# Patient Record
Sex: MALE | NOT HISPANIC OR LATINO | Employment: UNEMPLOYED | URBAN - METROPOLITAN AREA
[De-identification: names, ages, dates, MRNs, and addresses within clinical notes are randomized per-mention and may not be internally consistent; named-entity substitution may affect disease eponyms.]

---

## 2019-06-27 PROCEDURE — 99283 EMERGENCY DEPT VISIT LOW MDM: CPT

## 2019-06-28 ENCOUNTER — HOSPITAL ENCOUNTER (EMERGENCY)
Facility: HOSPITAL | Age: 44
Discharge: HOME | End: 2019-06-28
Attending: EMERGENCY MEDICINE | Admitting: EMERGENCY MEDICINE

## 2019-06-28 VITALS
RESPIRATION RATE: 17 BRPM | OXYGEN SATURATION: 99 % | HEART RATE: 69 BPM | HEIGHT: 68 IN | DIASTOLIC BLOOD PRESSURE: 95 MMHG | BODY MASS INDEX: 23.19 KG/M2 | WEIGHT: 153 LBS | TEMPERATURE: 98.9 F | SYSTOLIC BLOOD PRESSURE: 137 MMHG

## 2019-06-28 DIAGNOSIS — G89.29 CHRONIC BILATERAL LOW BACK PAIN WITHOUT SCIATICA: Primary | ICD-10-CM

## 2019-06-28 DIAGNOSIS — M54.50 CHRONIC BILATERAL LOW BACK PAIN WITHOUT SCIATICA: Primary | ICD-10-CM

## 2019-06-28 LAB
HOLD SPECIMEN: NORMAL
HOLD SPECIMEN: NORMAL
WHOLE BLOOD HOLD SPECIMEN: NORMAL
WHOLE BLOOD HOLD SPECIMEN: NORMAL

## 2019-06-28 PROCEDURE — 25010000002 HYDROMORPHONE 1 MG/ML SOLUTION: Performed by: EMERGENCY MEDICINE

## 2019-06-28 PROCEDURE — 25010000002 ONDANSETRON PER 1 MG: Performed by: EMERGENCY MEDICINE

## 2019-06-28 PROCEDURE — 96374 THER/PROPH/DIAG INJ IV PUSH: CPT

## 2019-06-28 PROCEDURE — 96375 TX/PRO/DX INJ NEW DRUG ADDON: CPT

## 2019-06-28 PROCEDURE — 25010000002 LORAZEPAM PER 2 MG: Performed by: EMERGENCY MEDICINE

## 2019-06-28 RX ORDER — LORAZEPAM 1 MG/1
1 TABLET ORAL EVERY 8 HOURS PRN
Qty: 15 TABLET | Refills: 0 | Status: SHIPPED | OUTPATIENT
Start: 2019-06-28

## 2019-06-28 RX ORDER — ONDANSETRON 2 MG/ML
4 INJECTION INTRAMUSCULAR; INTRAVENOUS ONCE
Status: COMPLETED | OUTPATIENT
Start: 2019-06-28 | End: 2019-06-28

## 2019-06-28 RX ORDER — CYCLOBENZAPRINE HCL 10 MG
10 TABLET ORAL 3 TIMES DAILY PRN
COMMUNITY
End: 2019-06-28

## 2019-06-28 RX ORDER — CLONAZEPAM 1 MG/1
1 TABLET ORAL 2 TIMES DAILY
COMMUNITY

## 2019-06-28 RX ORDER — LORAZEPAM 2 MG/ML
0.5 INJECTION INTRAMUSCULAR ONCE
Status: COMPLETED | OUTPATIENT
Start: 2019-06-28 | End: 2019-06-28

## 2019-06-28 RX ORDER — SERTRALINE HYDROCHLORIDE 100 MG/1
100 TABLET, FILM COATED ORAL NIGHTLY
COMMUNITY

## 2019-06-28 RX ORDER — GABAPENTIN 800 MG/1
800 TABLET ORAL NIGHTLY
COMMUNITY

## 2019-06-28 RX ORDER — SODIUM CHLORIDE 0.9 % (FLUSH) 0.9 %
10 SYRINGE (ML) INJECTION AS NEEDED
Status: DISCONTINUED | OUTPATIENT
Start: 2019-06-28 | End: 2019-06-28 | Stop reason: HOSPADM

## 2019-06-28 RX ORDER — HYDROMORPHONE HYDROCHLORIDE 4 MG/1
4 TABLET ORAL EVERY 6 HOURS PRN
COMMUNITY
End: 2019-07-16 | Stop reason: HOSPADM

## 2019-06-28 RX ORDER — OXCARBAZEPINE 150 MG/1
150 TABLET, FILM COATED ORAL NIGHTLY
COMMUNITY

## 2019-06-28 RX ORDER — VARENICLINE TARTRATE 1 MG/1
1 TABLET, FILM COATED ORAL 2 TIMES DAILY
COMMUNITY

## 2019-06-28 RX ADMIN — LORAZEPAM 0.5 MG: 2 INJECTION INTRAMUSCULAR; INTRAVENOUS at 01:24

## 2019-06-28 RX ADMIN — HYDROMORPHONE HYDROCHLORIDE 2 MG: 1 INJECTION, SOLUTION INTRAMUSCULAR; INTRAVENOUS; SUBCUTANEOUS at 01:21

## 2019-06-28 RX ADMIN — ONDANSETRON HYDROCHLORIDE 4 MG: 2 SOLUTION INTRAMUSCULAR; INTRAVENOUS at 01:20

## 2019-07-05 ENCOUNTER — HOSPITAL ENCOUNTER (OUTPATIENT)
Dept: GENERAL RADIOLOGY | Facility: HOSPITAL | Age: 44
Discharge: HOME | End: 2019-07-05
Admitting: ORTHOPAEDIC SURGERY

## 2019-07-05 ENCOUNTER — APPOINTMENT (OUTPATIENT)
Dept: PREADMISSION TESTING | Facility: HOSPITAL | Age: 44
End: 2019-07-05

## 2019-07-05 VITALS
WEIGHT: 166 LBS | DIASTOLIC BLOOD PRESSURE: 79 MMHG | TEMPERATURE: 97.7 F | SYSTOLIC BLOOD PRESSURE: 120 MMHG | HEIGHT: 68 IN | BODY MASS INDEX: 25.16 KG/M2 | HEART RATE: 56 BPM | OXYGEN SATURATION: 100 % | RESPIRATION RATE: 20 BRPM

## 2019-07-05 LAB
25(OH)D3 SERPL-MCNC: 29 NG/ML (ref 30–100)
ABO GROUP BLD: NORMAL
ALBUMIN SERPL-MCNC: 4.2 G/DL (ref 3.5–5.2)
ALBUMIN/GLOB SERPL: 1.4 G/DL
ALP SERPL-CCNC: 80 U/L (ref 39–117)
ALT SERPL W P-5'-P-CCNC: 9 U/L (ref 1–41)
ANION GAP SERPL CALCULATED.3IONS-SCNC: 12.7 MMOL/L (ref 5–15)
APTT PPP: 29 SECONDS (ref 22.7–35.4)
AST SERPL-CCNC: 14 U/L (ref 1–40)
BASOPHILS # BLD AUTO: 0.1 10*3/MM3 (ref 0–0.2)
BASOPHILS NFR BLD AUTO: 1.2 % (ref 0–1.5)
BILIRUB SERPL-MCNC: 0.2 MG/DL (ref 0.2–1.2)
BILIRUB UR QL STRIP: NEGATIVE
BLD GP AB SCN SERPL QL: NEGATIVE
BUN BLD-MCNC: 10 MG/DL (ref 6–20)
BUN/CREAT SERPL: 10.6 (ref 7–25)
CALCIUM SPEC-SCNC: 9.3 MG/DL (ref 8.6–10.5)
CHLORIDE SERPL-SCNC: 101 MMOL/L (ref 98–107)
CLARITY UR: CLEAR
CO2 SERPL-SCNC: 27.3 MMOL/L (ref 22–29)
COLOR UR: YELLOW
CREAT BLD-MCNC: 0.94 MG/DL (ref 0.76–1.27)
DEPRECATED RDW RBC AUTO: 46.1 FL (ref 37–54)
EOSINOPHIL # BLD AUTO: 0.31 10*3/MM3 (ref 0–0.4)
EOSINOPHIL NFR BLD AUTO: 3.8 % (ref 0.3–6.2)
ERYTHROCYTE [DISTWIDTH] IN BLOOD BY AUTOMATED COUNT: 12.9 % (ref 12.3–15.4)
GFR SERPL CREATININE-BSD FRML MDRD: 88 ML/MIN/1.73
GLOBULIN UR ELPH-MCNC: 2.9 GM/DL
GLUCOSE BLD-MCNC: 90 MG/DL (ref 65–99)
GLUCOSE UR STRIP-MCNC: NEGATIVE MG/DL
HCT VFR BLD AUTO: 45.1 % (ref 37.5–51)
HGB BLD-MCNC: 15 G/DL (ref 13–17.7)
HGB UR QL STRIP.AUTO: NEGATIVE
IMM GRANULOCYTES # BLD AUTO: 0.03 10*3/MM3 (ref 0–0.05)
IMM GRANULOCYTES NFR BLD AUTO: 0.4 % (ref 0–0.5)
INR PPP: 0.86 (ref 0.9–1.1)
KETONES UR QL STRIP: NEGATIVE
LEUKOCYTE ESTERASE UR QL STRIP.AUTO: NEGATIVE
LYMPHOCYTES # BLD AUTO: 3.29 10*3/MM3 (ref 0.7–3.1)
LYMPHOCYTES NFR BLD AUTO: 40.1 % (ref 19.6–45.3)
MCH RBC QN AUTO: 31.8 PG (ref 26.6–33)
MCHC RBC AUTO-ENTMCNC: 33.3 G/DL (ref 31.5–35.7)
MCV RBC AUTO: 95.8 FL (ref 79–97)
MONOCYTES # BLD AUTO: 0.85 10*3/MM3 (ref 0.1–0.9)
MONOCYTES NFR BLD AUTO: 10.4 % (ref 5–12)
NEUTROPHILS # BLD AUTO: 3.62 10*3/MM3 (ref 1.7–7)
NEUTROPHILS NFR BLD AUTO: 44.1 % (ref 42.7–76)
NITRITE UR QL STRIP: NEGATIVE
NRBC BLD AUTO-RTO: 0 /100 WBC (ref 0–0.2)
PH UR STRIP.AUTO: 6.5 [PH] (ref 5–8)
PLATELET # BLD AUTO: 325 10*3/MM3 (ref 140–450)
PMV BLD AUTO: 10.8 FL (ref 6–12)
POTASSIUM BLD-SCNC: 3.9 MMOL/L (ref 3.5–5.2)
PROT SERPL-MCNC: 7.1 G/DL (ref 6–8.5)
PROT UR QL STRIP: NEGATIVE
PROTHROMBIN TIME: 11.5 SECONDS (ref 11.7–14.2)
RBC # BLD AUTO: 4.71 10*6/MM3 (ref 4.14–5.8)
RH BLD: POSITIVE
SODIUM BLD-SCNC: 141 MMOL/L (ref 136–145)
SP GR UR STRIP: 1.01 (ref 1–1.03)
T&S EXPIRATION DATE: NORMAL
UROBILINOGEN UR QL STRIP: NORMAL
WBC NRBC COR # BLD: 8.2 10*3/MM3 (ref 3.4–10.8)

## 2019-07-05 PROCEDURE — 86850 RBC ANTIBODY SCREEN: CPT | Performed by: ORTHOPAEDIC SURGERY

## 2019-07-05 PROCEDURE — 86900 BLOOD TYPING SEROLOGIC ABO: CPT | Performed by: ORTHOPAEDIC SURGERY

## 2019-07-05 PROCEDURE — 85025 COMPLETE CBC W/AUTO DIFF WBC: CPT | Performed by: ORTHOPAEDIC SURGERY

## 2019-07-05 PROCEDURE — 82306 VITAMIN D 25 HYDROXY: CPT | Performed by: ORTHOPAEDIC SURGERY

## 2019-07-05 PROCEDURE — 71046 X-RAY EXAM CHEST 2 VIEWS: CPT

## 2019-07-05 PROCEDURE — 86901 BLOOD TYPING SEROLOGIC RH(D): CPT | Performed by: ORTHOPAEDIC SURGERY

## 2019-07-05 PROCEDURE — 85730 THROMBOPLASTIN TIME PARTIAL: CPT | Performed by: ORTHOPAEDIC SURGERY

## 2019-07-05 PROCEDURE — 93010 ELECTROCARDIOGRAM REPORT: CPT | Performed by: INTERNAL MEDICINE

## 2019-07-05 PROCEDURE — 81003 URINALYSIS AUTO W/O SCOPE: CPT | Performed by: ORTHOPAEDIC SURGERY

## 2019-07-05 PROCEDURE — G0480 DRUG TEST DEF 1-7 CLASSES: HCPCS | Performed by: ORTHOPAEDIC SURGERY

## 2019-07-05 PROCEDURE — 93005 ELECTROCARDIOGRAM TRACING: CPT

## 2019-07-05 PROCEDURE — 36415 COLL VENOUS BLD VENIPUNCTURE: CPT

## 2019-07-05 PROCEDURE — 80053 COMPREHEN METABOLIC PANEL: CPT | Performed by: ORTHOPAEDIC SURGERY

## 2019-07-05 PROCEDURE — 85610 PROTHROMBIN TIME: CPT | Performed by: ORTHOPAEDIC SURGERY

## 2019-07-05 RX ORDER — POLYETHYLENE GLYCOL 3350 17 G/17G
17 POWDER, FOR SOLUTION ORAL EVERY MORNING
COMMUNITY

## 2019-07-05 RX ORDER — OMEPRAZOLE 40 MG/1
40 CAPSULE, DELAYED RELEASE ORAL EVERY EVENING
COMMUNITY

## 2019-07-05 NOTE — DISCHARGE INSTRUCTIONS
Take the following medications the morning of surgery with a small sip of water:  OMEPRAZOLE    ARRIVAL TIME 8:00  AM    General Instructions:  • Do not eat solid food after midnight the night before surgery.  • You may drink clear liquids day of surgery but must stop at least one hour before your hospital arrival time.  • It is beneficial for you to have a clear drink that contains carbohydrates the day of surgery.  We suggest a 12 to 20 ounce bottle of Gatorade or Powerade for non-diabetic patients or a 12 to 20 ounce bottle of G2 or Powerade Zero for diabetic patients. (Pediatric patients, are not advised to drink a 12 to 20 ounce carbohydrate drink)    Clear liquids are liquids you can see through.  Nothing red in color.     Plain water                               Sports drinks  Sodas                                   Gelatin (Jell-O)  Fruit juices without pulp such as white grape juice and apple juice  Popsicles that contain no fruit or yogurt  Tea or coffee (no cream or milk added)  Gatorade / Powerade  G2 / Powerade Zero    • Infants may have breast milk up to four hours before surgery.  • Infants drinking formula may drink formula up to six hours before surgery.   • Patients who avoid smoking, chewing tobacco and alcohol for 4 weeks prior to surgery have a reduced risk of post-operative complications.  Quit smoking as many days before surgery as you can.  • Do not smoke, use chewing tobacco or drink alcohol the day of surgery.   • If applicable bring your C-PAP/ BI-PAP machine.  • Bring any papers given to you in the doctor’s office.  • Wear clean comfortable clothes and socks.  • Do not wear contact lenses, false eyelashes or make-up.  Bring a case for your glasses.   • Bring crutches or walker if applicable.  • Remove all piercings.  Leave jewelry and any other valuables at home.  • Hair extensions with metal clips must be removed prior to surgery.  • The Pre-Admission Testing nurse will instruct you to  bring medications if unable to obtain an accurate list in Pre-Admission Testing.        If you were given a blood bank ID arm band remember to bring it with you the day of surgery.    Preventing a Surgical Site Infection:  • For 2 to 3 days before surgery, avoid shaving with a razor because the razor can irritate skin and make it easier to develop an infection.    • Any areas of open skin can increase the risk of a post-operative wound infection by allowing bacteria to enter and travel throughout the body.  Notify your surgeon if you have any skin wounds / rashes even if it is not near the expected surgical site.  The area will need assessed to determine if surgery should be delayed until it is healed.  • The night prior to surgery sleep in a clean bed with clean clothing.  Do not allow pets to sleep with you.  • Shower on the morning of surgery using a fresh bar of anti-bacterial soap (such as Dial) and clean washcloth.  Dry with a clean towel and dress in clean clothing.  • Ask your surgeon if you will be receiving antibiotics prior to surgery.  • Make sure you, your family, and all healthcare providers clean their hands with soap and water or an alcohol based hand  before caring for you or your wound.    Day of surgery:  Upon arrival, a Pre-op nurse and Anesthesiologist will review your health history, obtain vital signs, and answer questions you may have.  The only belongings needed at this time will be a list of your home medications and if applicable your C-PAP/BI-PAP machine.  If you are staying overnight your family can leave the rest of your belongings in the car and bring them to your room later.  A Pre-op nurse will start an IV and you may receive medication in preparation for surgery, including something to help you relax.  Your family will be able to see you in the Pre-op area.  While you are in surgery your family should notify the waiting room  if they leave the waiting room area  and provide a contact phone number.    Please be aware that surgery does come with discomfort.  We want to make every effort to control your discomfort so please discuss any uncontrolled symptoms with your nurse.   Your doctor will most likely have prescribed pain medications.      If you are going home after surgery you will receive individualized written care instructions before being discharged.  A responsible adult must drive you to and from the hospital on the day of your surgery and stay with you for 24 hours.    If you are staying overnight following surgery, you will be transported to your hospital room following the recovery period.  [facility] has all private rooms.    You have received a list of surgical assistants for your reference.  If you have any questions please call Pre-Admission Testing at [phone].  Deductibles and co-payments are collected on the day of service. Please be prepared to pay the required co-pay, deductible or deposit on the day of service as defined by your plan.

## 2019-07-06 LAB
COTININE UR-MCNC: POSITIVE NG/ML
Lab: ABNORMAL

## 2019-07-10 ENCOUNTER — ANESTHESIA (OUTPATIENT)
Dept: PERIOP | Facility: HOSPITAL | Age: 44
End: 2019-07-10

## 2019-07-10 ENCOUNTER — ANESTHESIA EVENT (OUTPATIENT)
Dept: PERIOP | Facility: HOSPITAL | Age: 44
End: 2019-07-10

## 2019-07-10 ENCOUNTER — HOSPITAL ENCOUNTER (INPATIENT)
Facility: HOSPITAL | Age: 44
LOS: 6 days | Discharge: NURSING, REHAB OR LONG-TERM CARE | End: 2019-07-16
Attending: ORTHOPAEDIC SURGERY | Admitting: ORTHOPAEDIC SURGERY

## 2019-07-10 ENCOUNTER — APPOINTMENT (OUTPATIENT)
Dept: GENERAL RADIOLOGY | Facility: HOSPITAL | Age: 44
End: 2019-07-10

## 2019-07-10 DIAGNOSIS — M48.061 SPINAL STENOSIS OF LUMBAR REGION WITHOUT NEUROGENIC CLAUDICATION: Primary | ICD-10-CM

## 2019-07-10 PROCEDURE — C9290 INJ, BUPIVACAINE LIPOSOME: HCPCS | Performed by: ORTHOPAEDIC SURGERY

## 2019-07-10 PROCEDURE — 25010000002 KETOROLAC TROMETHAMINE PER 15 MG: Performed by: ANESTHESIOLOGY

## 2019-07-10 PROCEDURE — 25010000003 BUPIVACAINE LIPOSOME 1.3 % SUSPENSION 20 ML VIAL: Performed by: ORTHOPAEDIC SURGERY

## 2019-07-10 PROCEDURE — 0ST20ZZ RESECTION OF LUMBAR VERTEBRAL DISC, OPEN APPROACH: ICD-10-PCS | Performed by: ORTHOPAEDIC SURGERY

## 2019-07-10 PROCEDURE — C1713 ANCHOR/SCREW BN/BN,TIS/BN: HCPCS | Performed by: ORTHOPAEDIC SURGERY

## 2019-07-10 PROCEDURE — 25010000003 CEFAZOLIN IN DEXTROSE 2-4 GM/100ML-% SOLUTION: Performed by: ORTHOPAEDIC SURGERY

## 2019-07-10 PROCEDURE — 25010000002 FENTANYL CITRATE (PF) 100 MCG/2ML SOLUTION: Performed by: ANESTHESIOLOGY

## 2019-07-10 PROCEDURE — 25010000002 PROPOFOL 1000 MG/ML EMULSION: Performed by: ANESTHESIOLOGY

## 2019-07-10 PROCEDURE — 0SG1071 FUSION OF 2 OR MORE LUMBAR VERTEBRAL JOINTS WITH AUTOLOGOUS TISSUE SUBSTITUTE, POSTERIOR APPROACH, POSTERIOR COLUMN, OPEN APPROACH: ICD-10-PCS | Performed by: ORTHOPAEDIC SURGERY

## 2019-07-10 PROCEDURE — 25010000002 MIDAZOLAM PER 1 MG: Performed by: ANESTHESIOLOGY

## 2019-07-10 PROCEDURE — 25010000002 PROPOFOL 10 MG/ML EMULSION: Performed by: ANESTHESIOLOGY

## 2019-07-10 PROCEDURE — 25010000002 VANCOMYCIN 1 G RECONSTITUTED SOLUTION 1 EACH VIAL: Performed by: ORTHOPAEDIC SURGERY

## 2019-07-10 PROCEDURE — 25010000002 DEXAMETHASONE PER 1 MG: Performed by: ANESTHESIOLOGY

## 2019-07-10 PROCEDURE — C1762 CONN TISS, HUMAN(INC FASCIA): HCPCS | Performed by: ORTHOPAEDIC SURGERY

## 2019-07-10 PROCEDURE — 25010000002 HYDROMORPHONE PER 4 MG: Performed by: ANESTHESIOLOGY

## 2019-07-10 PROCEDURE — 25010000002 MIDAZOLAM PER 1 MG

## 2019-07-10 PROCEDURE — 25010000002 DEXAMETHASONE PER 1 MG: Performed by: ORTHOPAEDIC SURGERY

## 2019-07-10 PROCEDURE — 25010000002 SUCCINYLCHOLINE PER 20 MG: Performed by: ANESTHESIOLOGY

## 2019-07-10 PROCEDURE — C1769 GUIDE WIRE: HCPCS | Performed by: ORTHOPAEDIC SURGERY

## 2019-07-10 PROCEDURE — 72100 X-RAY EXAM L-S SPINE 2/3 VWS: CPT

## 2019-07-10 PROCEDURE — 25010000002 ONDANSETRON PER 1 MG: Performed by: ANESTHESIOLOGY

## 2019-07-10 PROCEDURE — 25010000002 METHOCARBAMOL 1000 MG/10ML SOLUTION: Performed by: ORTHOPAEDIC SURGERY

## 2019-07-10 PROCEDURE — 0SG10AJ FUSION OF 2 OR MORE LUMBAR VERTEBRAL JOINTS WITH INTERBODY FUSION DEVICE, POSTERIOR APPROACH, ANTERIOR COLUMN, OPEN APPROACH: ICD-10-PCS | Performed by: ORTHOPAEDIC SURGERY

## 2019-07-10 PROCEDURE — 76000 FLUOROSCOPY <1 HR PHYS/QHP: CPT

## 2019-07-10 DEVICE — WAX BONE HEMO NAT 2.5G: Type: IMPLANTABLE DEVICE | Site: SPINE LUMBAR | Status: FUNCTIONAL

## 2019-07-10 DEVICE — BATTALION LATERAL SPACER, PEEK, 15°, 18 MM WIDE, 12 X 55 MM
Type: IMPLANTABLE DEVICE | Site: SPINE LUMBAR | Status: FUNCTIONAL
Brand: BATTALION

## 2019-07-10 DEVICE — CANNULATED EXTENDED TAB POLYAXIAL REDUCTION SCREW, 6.5 MM X 40 MM
Type: IMPLANTABLE DEVICE | Site: SPINE LUMBAR | Status: FUNCTIONAL
Brand: INVICTUS

## 2019-07-10 DEVICE — BONE GRAFT KIT 7510400 INFUSE MEDIUM
Type: IMPLANTABLE DEVICE | Site: SPINE LUMBAR | Status: FUNCTIONAL
Brand: INFUSE® BONE GRAFT

## 2019-07-10 DEVICE — ALLOGRFT MORSELIZED CANC TRINITY ELITE LG: Type: IMPLANTABLE DEVICE | Site: SPINE LUMBAR | Status: FUNCTIONAL

## 2019-07-10 DEVICE — BONE CCCS 20 TO 80 30CC: Type: IMPLANTABLE DEVICE | Site: SPINE LUMBAR | Status: FUNCTIONAL

## 2019-07-10 DEVICE — TI MIS LORDOTIC ROD, 5.5 MM X 90 MM
Type: IMPLANTABLE DEVICE | Site: SPINE LUMBAR | Status: FUNCTIONAL
Brand: INVICTUS

## 2019-07-10 DEVICE — SET SCREW
Type: IMPLANTABLE DEVICE | Site: SPINE LUMBAR | Status: FUNCTIONAL
Brand: INVICTUS

## 2019-07-10 RX ORDER — PROMETHAZINE HYDROCHLORIDE 25 MG/1
25 TABLET ORAL ONCE AS NEEDED
Status: DISCONTINUED | OUTPATIENT
Start: 2019-07-10 | End: 2019-07-10 | Stop reason: HOSPADM

## 2019-07-10 RX ORDER — GABAPENTIN 400 MG/1
400 CAPSULE ORAL
Status: DISCONTINUED | OUTPATIENT
Start: 2019-07-10 | End: 2019-07-16 | Stop reason: HOSPADM

## 2019-07-10 RX ORDER — NALOXONE HCL 0.4 MG/ML
0.1 VIAL (ML) INJECTION
Status: DISCONTINUED | OUTPATIENT
Start: 2019-07-10 | End: 2019-07-16 | Stop reason: HOSPADM

## 2019-07-10 RX ORDER — PROMETHAZINE HYDROCHLORIDE 25 MG/1
12.5 SUPPOSITORY RECTAL EVERY 6 HOURS PRN
Status: DISCONTINUED | OUTPATIENT
Start: 2019-07-10 | End: 2019-07-16 | Stop reason: HOSPADM

## 2019-07-10 RX ORDER — FLUMAZENIL 0.1 MG/ML
0.2 INJECTION INTRAVENOUS AS NEEDED
Status: DISCONTINUED | OUTPATIENT
Start: 2019-07-10 | End: 2019-07-10 | Stop reason: HOSPADM

## 2019-07-10 RX ORDER — FENTANYL CITRATE 50 UG/ML
50 INJECTION, SOLUTION INTRAMUSCULAR; INTRAVENOUS
Status: DISCONTINUED | OUTPATIENT
Start: 2019-07-10 | End: 2019-07-10 | Stop reason: HOSPADM

## 2019-07-10 RX ORDER — HYDROMORPHONE HYDROCHLORIDE 4 MG/1
4 TABLET ORAL EVERY 4 HOURS PRN
Status: DISCONTINUED | OUTPATIENT
Start: 2019-07-10 | End: 2019-07-12

## 2019-07-10 RX ORDER — OMEGA-3S/DHA/EPA/FISH OIL/D3 300MG-1000
400 CAPSULE ORAL DAILY
Status: DISCONTINUED | OUTPATIENT
Start: 2019-07-10 | End: 2019-07-16 | Stop reason: HOSPADM

## 2019-07-10 RX ORDER — IBUPROFEN 800 MG
400 TABLET ORAL EVERY EVENING
COMMUNITY

## 2019-07-10 RX ORDER — PROMETHAZINE HYDROCHLORIDE 25 MG/ML
12.5 INJECTION, SOLUTION INTRAMUSCULAR; INTRAVENOUS EVERY 6 HOURS PRN
Status: DISCONTINUED | OUTPATIENT
Start: 2019-07-10 | End: 2019-07-16 | Stop reason: HOSPADM

## 2019-07-10 RX ORDER — PANTOPRAZOLE SODIUM 40 MG/1
40 TABLET, DELAYED RELEASE ORAL EVERY MORNING
Status: DISCONTINUED | OUTPATIENT
Start: 2019-07-11 | End: 2019-07-16 | Stop reason: HOSPADM

## 2019-07-10 RX ORDER — DEXAMETHASONE SODIUM PHOSPHATE 10 MG/ML
INJECTION INTRAMUSCULAR; INTRAVENOUS AS NEEDED
Status: DISCONTINUED | OUTPATIENT
Start: 2019-07-10 | End: 2019-07-10 | Stop reason: SURG

## 2019-07-10 RX ORDER — BISACODYL 5 MG/1
5 TABLET, DELAYED RELEASE ORAL DAILY PRN
Status: DISCONTINUED | OUTPATIENT
Start: 2019-07-10 | End: 2019-07-16 | Stop reason: HOSPADM

## 2019-07-10 RX ORDER — CEFAZOLIN SODIUM 2 G/100ML
2 INJECTION, SOLUTION INTRAVENOUS ONCE
Status: COMPLETED | OUTPATIENT
Start: 2019-07-10 | End: 2019-07-10

## 2019-07-10 RX ORDER — ONDANSETRON 2 MG/ML
4 INJECTION INTRAMUSCULAR; INTRAVENOUS EVERY 6 HOURS PRN
Status: DISCONTINUED | OUTPATIENT
Start: 2019-07-10 | End: 2019-07-16 | Stop reason: HOSPADM

## 2019-07-10 RX ORDER — PROMETHAZINE HYDROCHLORIDE 25 MG/ML
12.5 INJECTION, SOLUTION INTRAMUSCULAR; INTRAVENOUS ONCE AS NEEDED
Status: DISCONTINUED | OUTPATIENT
Start: 2019-07-10 | End: 2019-07-10 | Stop reason: HOSPADM

## 2019-07-10 RX ORDER — MIDAZOLAM HYDROCHLORIDE 1 MG/ML
1 INJECTION INTRAMUSCULAR; INTRAVENOUS
Status: DISCONTINUED | OUTPATIENT
Start: 2019-07-10 | End: 2019-07-10 | Stop reason: HOSPADM

## 2019-07-10 RX ORDER — GABAPENTIN 800 MG/1
400 TABLET ORAL EVERY MORNING
COMMUNITY

## 2019-07-10 RX ORDER — OXYCODONE AND ACETAMINOPHEN 7.5; 325 MG/1; MG/1
1 TABLET ORAL ONCE AS NEEDED
Status: DISCONTINUED | OUTPATIENT
Start: 2019-07-10 | End: 2019-07-10 | Stop reason: HOSPADM

## 2019-07-10 RX ORDER — GABAPENTIN 300 MG/1
300 CAPSULE ORAL ONCE
Status: COMPLETED | OUTPATIENT
Start: 2019-07-10 | End: 2019-07-10

## 2019-07-10 RX ORDER — MIDAZOLAM HYDROCHLORIDE 1 MG/ML
2 INJECTION INTRAMUSCULAR; INTRAVENOUS
Status: DISCONTINUED | OUTPATIENT
Start: 2019-07-10 | End: 2019-07-10 | Stop reason: HOSPADM

## 2019-07-10 RX ORDER — METHOCARBAMOL 100 MG/ML
1000 INJECTION, SOLUTION INTRAMUSCULAR; INTRAVENOUS ONCE AS NEEDED
Status: COMPLETED | OUTPATIENT
Start: 2019-07-10 | End: 2019-07-10

## 2019-07-10 RX ORDER — CEFAZOLIN SODIUM 2 G/100ML
2 INJECTION, SOLUTION INTRAVENOUS EVERY 8 HOURS
Status: COMPLETED | OUTPATIENT
Start: 2019-07-10 | End: 2019-07-11

## 2019-07-10 RX ORDER — KETAMINE HYDROCHLORIDE 10 MG/ML
INJECTION INTRAMUSCULAR; INTRAVENOUS AS NEEDED
Status: DISCONTINUED | OUTPATIENT
Start: 2019-07-10 | End: 2019-07-10 | Stop reason: SURG

## 2019-07-10 RX ORDER — PROMETHAZINE HYDROCHLORIDE 25 MG/ML
6.25 INJECTION, SOLUTION INTRAMUSCULAR; INTRAVENOUS
Status: DISCONTINUED | OUTPATIENT
Start: 2019-07-10 | End: 2019-07-10 | Stop reason: HOSPADM

## 2019-07-10 RX ORDER — SODIUM CHLORIDE 0.9 % (FLUSH) 0.9 %
3 SYRINGE (ML) INJECTION EVERY 12 HOURS SCHEDULED
Status: DISCONTINUED | OUTPATIENT
Start: 2019-07-10 | End: 2019-07-10 | Stop reason: HOSPADM

## 2019-07-10 RX ORDER — MIDAZOLAM HYDROCHLORIDE 1 MG/ML
INJECTION INTRAMUSCULAR; INTRAVENOUS
Status: COMPLETED
Start: 2019-07-10 | End: 2019-07-10

## 2019-07-10 RX ORDER — FAMOTIDINE 10 MG/ML
20 INJECTION, SOLUTION INTRAVENOUS
Status: COMPLETED | OUTPATIENT
Start: 2019-07-10 | End: 2019-07-10

## 2019-07-10 RX ORDER — HYDRALAZINE HYDROCHLORIDE 20 MG/ML
5 INJECTION INTRAMUSCULAR; INTRAVENOUS
Status: DISCONTINUED | OUTPATIENT
Start: 2019-07-10 | End: 2019-07-10 | Stop reason: HOSPADM

## 2019-07-10 RX ORDER — ONDANSETRON 2 MG/ML
INJECTION INTRAMUSCULAR; INTRAVENOUS AS NEEDED
Status: DISCONTINUED | OUTPATIENT
Start: 2019-07-10 | End: 2019-07-10 | Stop reason: SURG

## 2019-07-10 RX ORDER — HYDROMORPHONE HCL IN 0.9% NACL 10 MG/50ML
PATIENT CONTROLLED ANALGESIA SYRINGE INTRAVENOUS CONTINUOUS
Status: DISPENSED | OUTPATIENT
Start: 2019-07-10 | End: 2019-07-11

## 2019-07-10 RX ORDER — SODIUM CHLORIDE, SODIUM LACTATE, POTASSIUM CHLORIDE, CALCIUM CHLORIDE 600; 310; 30; 20 MG/100ML; MG/100ML; MG/100ML; MG/100ML
9 INJECTION, SOLUTION INTRAVENOUS CONTINUOUS PRN
Status: DISCONTINUED | OUTPATIENT
Start: 2019-07-10 | End: 2019-07-10 | Stop reason: HOSPADM

## 2019-07-10 RX ORDER — CLONAZEPAM 0.5 MG/1
1 TABLET ORAL 2 TIMES DAILY
Status: DISCONTINUED | OUTPATIENT
Start: 2019-07-10 | End: 2019-07-16 | Stop reason: HOSPADM

## 2019-07-10 RX ORDER — GABAPENTIN 400 MG/1
400 CAPSULE ORAL
Status: DISCONTINUED | OUTPATIENT
Start: 2019-07-11 | End: 2019-07-16 | Stop reason: HOSPADM

## 2019-07-10 RX ORDER — SODIUM CHLORIDE 9 MG/ML
INJECTION, SOLUTION INTRAVENOUS AS NEEDED
Status: DISCONTINUED | OUTPATIENT
Start: 2019-07-10 | End: 2019-07-10 | Stop reason: HOSPADM

## 2019-07-10 RX ORDER — CYCLOBENZAPRINE HCL 10 MG
10 TABLET ORAL EVERY 8 HOURS SCHEDULED
Status: DISCONTINUED | OUTPATIENT
Start: 2019-07-10 | End: 2019-07-11

## 2019-07-10 RX ORDER — HYDROMORPHONE HCL 110MG/55ML
PATIENT CONTROLLED ANALGESIA SYRINGE INTRAVENOUS AS NEEDED
Status: DISCONTINUED | OUTPATIENT
Start: 2019-07-10 | End: 2019-07-10 | Stop reason: SURG

## 2019-07-10 RX ORDER — MAGNESIUM HYDROXIDE 1200 MG/15ML
LIQUID ORAL AS NEEDED
Status: DISCONTINUED | OUTPATIENT
Start: 2019-07-10 | End: 2019-07-10 | Stop reason: HOSPADM

## 2019-07-10 RX ORDER — OXYCODONE HCL 10 MG/1
10 TABLET, FILM COATED, EXTENDED RELEASE ORAL ONCE
Status: COMPLETED | OUTPATIENT
Start: 2019-07-10 | End: 2019-07-10

## 2019-07-10 RX ORDER — SUCCINYLCHOLINE CHLORIDE 20 MG/ML
INJECTION INTRAMUSCULAR; INTRAVENOUS AS NEEDED
Status: DISCONTINUED | OUTPATIENT
Start: 2019-07-10 | End: 2019-07-10 | Stop reason: SURG

## 2019-07-10 RX ORDER — DIPHENHYDRAMINE HCL 25 MG
25 CAPSULE ORAL
Status: DISCONTINUED | OUTPATIENT
Start: 2019-07-10 | End: 2019-07-10 | Stop reason: HOSPADM

## 2019-07-10 RX ORDER — HYDROMORPHONE HYDROCHLORIDE 1 MG/ML
0.5 INJECTION, SOLUTION INTRAMUSCULAR; INTRAVENOUS; SUBCUTANEOUS
Status: DISCONTINUED | OUTPATIENT
Start: 2019-07-10 | End: 2019-07-10 | Stop reason: HOSPADM

## 2019-07-10 RX ORDER — MIDAZOLAM HYDROCHLORIDE 1 MG/ML
INJECTION INTRAMUSCULAR; INTRAVENOUS AS NEEDED
Status: DISCONTINUED | OUTPATIENT
Start: 2019-07-10 | End: 2019-07-10 | Stop reason: SURG

## 2019-07-10 RX ORDER — ONDANSETRON 2 MG/ML
4 INJECTION INTRAMUSCULAR; INTRAVENOUS ONCE AS NEEDED
Status: DISCONTINUED | OUTPATIENT
Start: 2019-07-10 | End: 2019-07-10 | Stop reason: HOSPADM

## 2019-07-10 RX ORDER — FENTANYL CITRATE 50 UG/ML
INJECTION, SOLUTION INTRAMUSCULAR; INTRAVENOUS AS NEEDED
Status: DISCONTINUED | OUTPATIENT
Start: 2019-07-10 | End: 2019-07-10 | Stop reason: SURG

## 2019-07-10 RX ORDER — ONDANSETRON 4 MG/1
4 TABLET, FILM COATED ORAL EVERY 6 HOURS PRN
Status: DISCONTINUED | OUTPATIENT
Start: 2019-07-10 | End: 2019-07-16 | Stop reason: HOSPADM

## 2019-07-10 RX ORDER — HYDROCODONE BITARTRATE AND ACETAMINOPHEN 7.5; 325 MG/1; MG/1
1 TABLET ORAL ONCE AS NEEDED
Status: DISCONTINUED | OUTPATIENT
Start: 2019-07-10 | End: 2019-07-10 | Stop reason: HOSPADM

## 2019-07-10 RX ORDER — SERTRALINE HYDROCHLORIDE 100 MG/1
100 TABLET, FILM COATED ORAL NIGHTLY
Status: DISCONTINUED | OUTPATIENT
Start: 2019-07-10 | End: 2019-07-16 | Stop reason: HOSPADM

## 2019-07-10 RX ORDER — SODIUM CHLORIDE 0.9 % (FLUSH) 0.9 %
3-10 SYRINGE (ML) INJECTION AS NEEDED
Status: DISCONTINUED | OUTPATIENT
Start: 2019-07-10 | End: 2019-07-16 | Stop reason: HOSPADM

## 2019-07-10 RX ORDER — LABETALOL HYDROCHLORIDE 5 MG/ML
5 INJECTION, SOLUTION INTRAVENOUS
Status: DISCONTINUED | OUTPATIENT
Start: 2019-07-10 | End: 2019-07-10 | Stop reason: HOSPADM

## 2019-07-10 RX ORDER — NALOXONE HCL 0.4 MG/ML
0.2 VIAL (ML) INJECTION AS NEEDED
Status: DISCONTINUED | OUTPATIENT
Start: 2019-07-10 | End: 2019-07-10 | Stop reason: HOSPADM

## 2019-07-10 RX ORDER — GABAPENTIN 400 MG/1
800 CAPSULE ORAL NIGHTLY
Status: DISCONTINUED | OUTPATIENT
Start: 2019-07-10 | End: 2019-07-16 | Stop reason: HOSPADM

## 2019-07-10 RX ORDER — CYCLOBENZAPRINE HCL 10 MG
5-10 TABLET ORAL NIGHTLY PRN
COMMUNITY

## 2019-07-10 RX ORDER — SODIUM CHLORIDE 0.9 % (FLUSH) 0.9 %
3-10 SYRINGE (ML) INJECTION AS NEEDED
Status: DISCONTINUED | OUTPATIENT
Start: 2019-07-10 | End: 2019-07-10 | Stop reason: HOSPADM

## 2019-07-10 RX ORDER — SODIUM CHLORIDE 0.9 % (FLUSH) 0.9 %
3 SYRINGE (ML) INJECTION EVERY 12 HOURS SCHEDULED
Status: DISCONTINUED | OUTPATIENT
Start: 2019-07-10 | End: 2019-07-16 | Stop reason: HOSPADM

## 2019-07-10 RX ORDER — MELATONIN 10 MG
10 CAPSULE ORAL NIGHTLY
COMMUNITY

## 2019-07-10 RX ORDER — VARENICLINE TARTRATE 0.5 MG/1
1 TABLET, FILM COATED ORAL 2 TIMES DAILY
Status: DISCONTINUED | OUTPATIENT
Start: 2019-07-10 | End: 2019-07-16 | Stop reason: HOSPADM

## 2019-07-10 RX ORDER — PROMETHAZINE HYDROCHLORIDE 25 MG/1
25 SUPPOSITORY RECTAL ONCE AS NEEDED
Status: DISCONTINUED | OUTPATIENT
Start: 2019-07-10 | End: 2019-07-10 | Stop reason: HOSPADM

## 2019-07-10 RX ORDER — EPHEDRINE SULFATE 50 MG/ML
5 INJECTION, SOLUTION INTRAVENOUS ONCE AS NEEDED
Status: DISCONTINUED | OUTPATIENT
Start: 2019-07-10 | End: 2019-07-10 | Stop reason: HOSPADM

## 2019-07-10 RX ORDER — PROMETHAZINE HYDROCHLORIDE 12.5 MG/1
12.5 TABLET ORAL EVERY 6 HOURS PRN
Status: DISCONTINUED | OUTPATIENT
Start: 2019-07-10 | End: 2019-07-16 | Stop reason: HOSPADM

## 2019-07-10 RX ORDER — GABAPENTIN 300 MG/1
600 CAPSULE ORAL ONCE
Status: DISCONTINUED | OUTPATIENT
Start: 2019-07-10 | End: 2019-07-10

## 2019-07-10 RX ORDER — DIPHENHYDRAMINE HYDROCHLORIDE 50 MG/ML
12.5 INJECTION INTRAMUSCULAR; INTRAVENOUS
Status: DISCONTINUED | OUTPATIENT
Start: 2019-07-10 | End: 2019-07-10 | Stop reason: HOSPADM

## 2019-07-10 RX ORDER — DOCUSATE SODIUM 100 MG/1
100 CAPSULE, LIQUID FILLED ORAL 2 TIMES DAILY PRN
Status: DISCONTINUED | OUTPATIENT
Start: 2019-07-10 | End: 2019-07-16 | Stop reason: HOSPADM

## 2019-07-10 RX ORDER — PROPOFOL 10 MG/ML
VIAL (ML) INTRAVENOUS AS NEEDED
Status: DISCONTINUED | OUTPATIENT
Start: 2019-07-10 | End: 2019-07-10 | Stop reason: SURG

## 2019-07-10 RX ORDER — ACETAMINOPHEN 325 MG/1
650 TABLET ORAL ONCE AS NEEDED
Status: DISCONTINUED | OUTPATIENT
Start: 2019-07-10 | End: 2019-07-10 | Stop reason: HOSPADM

## 2019-07-10 RX ORDER — KETOROLAC TROMETHAMINE 30 MG/ML
INJECTION, SOLUTION INTRAMUSCULAR; INTRAVENOUS AS NEEDED
Status: DISCONTINUED | OUTPATIENT
Start: 2019-07-10 | End: 2019-07-10 | Stop reason: SURG

## 2019-07-10 RX ORDER — SENNA AND DOCUSATE SODIUM 50; 8.6 MG/1; MG/1
1 TABLET, FILM COATED ORAL NIGHTLY PRN
Status: DISCONTINUED | OUTPATIENT
Start: 2019-07-10 | End: 2019-07-16 | Stop reason: HOSPADM

## 2019-07-10 RX ORDER — OXCARBAZEPINE 150 MG/1
150 TABLET, FILM COATED ORAL NIGHTLY
Status: DISCONTINUED | OUTPATIENT
Start: 2019-07-10 | End: 2019-07-16 | Stop reason: HOSPADM

## 2019-07-10 RX ORDER — BISACODYL 10 MG
10 SUPPOSITORY, RECTAL RECTAL DAILY PRN
Status: DISCONTINUED | OUTPATIENT
Start: 2019-07-10 | End: 2019-07-16 | Stop reason: HOSPADM

## 2019-07-10 RX ORDER — SODIUM CHLORIDE 450 MG/100ML
100 INJECTION, SOLUTION INTRAVENOUS CONTINUOUS
Status: DISCONTINUED | OUTPATIENT
Start: 2019-07-10 | End: 2019-07-15

## 2019-07-10 RX ADMIN — MIDAZOLAM HYDROCHLORIDE 1 MG: 1 INJECTION INTRAMUSCULAR; INTRAVENOUS at 15:44

## 2019-07-10 RX ADMIN — MIDAZOLAM 1 MG: 1 INJECTION INTRAMUSCULAR; INTRAVENOUS at 15:44

## 2019-07-10 RX ADMIN — SODIUM CHLORIDE, POTASSIUM CHLORIDE, SODIUM LACTATE AND CALCIUM CHLORIDE: 600; 310; 30; 20 INJECTION, SOLUTION INTRAVENOUS at 12:40

## 2019-07-10 RX ADMIN — SODIUM CHLORIDE 100 ML/HR: 4.5 INJECTION, SOLUTION INTRAVENOUS at 18:45

## 2019-07-10 RX ADMIN — DEXAMETHASONE SODIUM PHOSPHATE 8 MG: 10 INJECTION INTRAMUSCULAR; INTRAVENOUS at 13:17

## 2019-07-10 RX ADMIN — CEFAZOLIN SODIUM 2 G: 2 INJECTION, SOLUTION INTRAVENOUS at 21:51

## 2019-07-10 RX ADMIN — FENTANYL CITRATE 50 MCG: 50 INJECTION, SOLUTION INTRAMUSCULAR; INTRAVENOUS at 12:10

## 2019-07-10 RX ADMIN — HYDROMORPHONE HYDROCHLORIDE 1 MG: 2 INJECTION INTRAMUSCULAR; INTRAVENOUS; SUBCUTANEOUS at 12:20

## 2019-07-10 RX ADMIN — SODIUM CHLORIDE, POTASSIUM CHLORIDE, SODIUM LACTATE AND CALCIUM CHLORIDE 9 ML/HR: 600; 310; 30; 20 INJECTION, SOLUTION INTRAVENOUS at 09:15

## 2019-07-10 RX ADMIN — KETAMINE HYDROCHLORIDE 10 MG: 10 INJECTION INTRAMUSCULAR; INTRAVENOUS at 13:13

## 2019-07-10 RX ADMIN — GABAPENTIN 300 MG: 300 CAPSULE ORAL at 09:27

## 2019-07-10 RX ADMIN — METHOCARBAMOL 1000 MG: 100 INJECTION, SOLUTION INTRAMUSCULAR; INTRAVENOUS at 16:16

## 2019-07-10 RX ADMIN — FENTANYL CITRATE 50 MCG: 50 INJECTION INTRAMUSCULAR; INTRAVENOUS at 16:10

## 2019-07-10 RX ADMIN — SODIUM CHLORIDE, PRESERVATIVE FREE 3 ML: 5 INJECTION INTRAVENOUS at 21:52

## 2019-07-10 RX ADMIN — DEXAMETHASONE SODIUM PHOSPHATE 10 MG: 10 INJECTION INTRAMUSCULAR; INTRAVENOUS at 09:42

## 2019-07-10 RX ADMIN — Medication 2 MG: at 12:04

## 2019-07-10 RX ADMIN — KETAMINE HYDROCHLORIDE 10 MG: 10 INJECTION INTRAMUSCULAR; INTRAVENOUS at 12:26

## 2019-07-10 RX ADMIN — CLONAZEPAM 1 MG: 1 TABLET ORAL at 21:52

## 2019-07-10 RX ADMIN — FAMOTIDINE 20 MG: 10 INJECTION INTRAVENOUS at 09:41

## 2019-07-10 RX ADMIN — SERTRALINE 100 MG: 100 TABLET, FILM COATED ORAL at 21:52

## 2019-07-10 RX ADMIN — HYDROMORPHONE HYDROCHLORIDE 4 MG: 4 TABLET ORAL at 23:06

## 2019-07-10 RX ADMIN — CHOLECALCIFEROL TAB 10 MCG (400 UNIT) 400 UNITS: 10 TAB at 21:51

## 2019-07-10 RX ADMIN — MIDAZOLAM 1 MG: 1 INJECTION INTRAMUSCULAR; INTRAVENOUS at 11:10

## 2019-07-10 RX ADMIN — HYDROMORPHONE HYDROCHLORIDE 1 MG: 2 INJECTION INTRAMUSCULAR; INTRAVENOUS; SUBCUTANEOUS at 15:31

## 2019-07-10 RX ADMIN — FENTANYL CITRATE 50 MCG: 50 INJECTION INTRAMUSCULAR; INTRAVENOUS at 15:40

## 2019-07-10 RX ADMIN — KETAMINE HYDROCHLORIDE 10 MG: 10 INJECTION INTRAMUSCULAR; INTRAVENOUS at 13:58

## 2019-07-10 RX ADMIN — FENTANYL CITRATE 100 MCG: 50 INJECTION, SOLUTION INTRAMUSCULAR; INTRAVENOUS at 13:05

## 2019-07-10 RX ADMIN — OXYCODONE HYDROCHLORIDE 10 MG: 10 TABLET, FILM COATED, EXTENDED RELEASE ORAL at 09:27

## 2019-07-10 RX ADMIN — CEFAZOLIN SODIUM 2 G: 2 INJECTION, SOLUTION INTRAVENOUS at 12:45

## 2019-07-10 RX ADMIN — KETAMINE HYDROCHLORIDE 10 MG: 10 INJECTION INTRAMUSCULAR; INTRAVENOUS at 13:04

## 2019-07-10 RX ADMIN — VARENICLINE TARTRATE 1 MG: 0.5 TABLET, FILM COATED ORAL at 21:52

## 2019-07-10 RX ADMIN — SODIUM CHLORIDE 1 MCG/KG/HR: 900 INJECTION, SOLUTION INTRAVENOUS at 12:25

## 2019-07-10 RX ADMIN — PROPOFOL 200 MG: 10 INJECTION, EMULSION INTRAVENOUS at 12:10

## 2019-07-10 RX ADMIN — HYDROMORPHONE HYDROCHLORIDE: 10 INJECTION INTRAMUSCULAR; INTRAVENOUS; SUBCUTANEOUS at 16:02

## 2019-07-10 RX ADMIN — PROPOFOL 125 MCG/KG/MIN: 10 INJECTION, EMULSION INTRAVENOUS at 12:10

## 2019-07-10 RX ADMIN — KETOROLAC TROMETHAMINE 30 MG: 30 INJECTION, SOLUTION INTRAMUSCULAR; INTRAVENOUS at 15:06

## 2019-07-10 RX ADMIN — GABAPENTIN 800 MG: 400 CAPSULE ORAL at 21:51

## 2019-07-10 RX ADMIN — KETAMINE HYDROCHLORIDE 10 MG: 10 INJECTION INTRAMUSCULAR; INTRAVENOUS at 14:54

## 2019-07-10 RX ADMIN — FENTANYL CITRATE 100 MCG: 50 INJECTION, SOLUTION INTRAMUSCULAR; INTRAVENOUS at 12:08

## 2019-07-10 RX ADMIN — HYDROMORPHONE HYDROCHLORIDE 4 MG: 4 TABLET ORAL at 18:19

## 2019-07-10 RX ADMIN — OXCARBAZEPINE 150 MG: 150 TABLET, FILM COATED ORAL at 21:52

## 2019-07-10 RX ADMIN — ONDANSETRON 4 MG: 2 INJECTION INTRAMUSCULAR; INTRAVENOUS at 15:04

## 2019-07-10 RX ADMIN — SUCCINYLCHOLINE CHLORIDE 120 MG: 20 INJECTION, SOLUTION INTRAMUSCULAR; INTRAVENOUS; PARENTERAL at 12:13

## 2019-07-10 NOTE — PERIOPERATIVE NURSING NOTE
600MG GABAPENTIN WAS ORDERED FOR PT TO TAKE IN PREOP. PT TOOK 400MG THIS MORNING AT HOME. DR ENNIS NOTIFIED. DOSE CHANGED IN PREOP FROM 600MG TO 300MG.

## 2019-07-10 NOTE — NURSING NOTE
Patient had small bowel movement once positioned on OR table.  Patient cleansed and dried before prepping and draping completed.

## 2019-07-10 NOTE — PLAN OF CARE
Problem: Patient Care Overview  Goal: Plan of Care Review  Outcome: Ongoing (interventions implemented as appropriate)   07/10/19 3692   Coping/Psychosocial   Plan of Care Reviewed With patient   Plan of Care Review   Progress no change   OTHER   Outcome Summary post op today from lumbar fusion. Dressings are clean dry and intact. VSS. Voiding per hess at this time. numbness and tingling to BLE. PCA intact but pt reports that pain is not controlled. Call placed to doctor about pain control. Will continue to monitor.      Goal: Individualization and Mutuality  Outcome: Ongoing (interventions implemented as appropriate)    Goal: Discharge Needs Assessment  Outcome: Ongoing (interventions implemented as appropriate)    Goal: Interprofessional Rounds/Family Conf  Outcome: Ongoing (interventions implemented as appropriate)      Problem: Pain, Chronic (Adult)  Goal: Identify Related Risk Factors and Signs and Symptoms  Outcome: Ongoing (interventions implemented as appropriate)    Goal: Acceptable Pain/Comfort Level and Functional Ability  Outcome: Ongoing (interventions implemented as appropriate)      Problem: Fall Risk (Adult)  Goal: Identify Related Risk Factors and Signs and Symptoms  Outcome: Ongoing (interventions implemented as appropriate)    Goal: Absence of Fall  Outcome: Ongoing (interventions implemented as appropriate)      Problem: Laminectomy/Foraminotomy/Discectomy (Adult)  Goal: Signs and Symptoms of Listed Potential Problems Will be Absent, Minimized or Managed (Laminectomy/Foraminotomy/Discectomy)  Outcome: Ongoing (interventions implemented as appropriate)    Goal: Anesthesia/Sedation Recovery  Outcome: Ongoing (interventions implemented as appropriate)

## 2019-07-10 NOTE — ANESTHESIA PROCEDURE NOTES
Airway  Urgency: elective    Date/Time: 7/10/2019 12:17 PM  End Time:7/10/2019 12:17 PM  Airway not difficult    General Information and Staff    Patient location during procedure: OR  Anesthesiologist: Gregory Stovall MD    Indications and Patient Condition  Indications for airway management: airway protection    Preoxygenated: yes  Mask difficulty assessment: 1 - vent by mask    Final Airway Details  Final airway type: endotracheal airway      Successful airway: ETT  Cuffed: yes   Successful intubation technique: direct laryngoscopy  Endotracheal tube insertion site: oral  Blade: Rogelio  Blade size: 4  ETT size (mm): 7.5  Cormack-Lehane Classification: grade I - full view of glottis  Placement verified by: chest auscultation   Measured from: lips  ETT to lips (cm): 23  Number of attempts at approach: 1    Additional Comments  1 time successful with 7.0 ETT however there was a leak around the ETT cuff therefore sized up ETT to 7.5 without complication

## 2019-07-10 NOTE — PERIOPERATIVE NURSING NOTE
Patient complains of low back pain that radiates down both legs with intermittent numbness and tingling.

## 2019-07-10 NOTE — OP NOTE
PREOPERATIVE DIAGNOSES:  1.  Lumbar instability  2.  Lumbar spinal stenosis  3.  Lumbar radiculopathy   POSTOPERATIVE DIAGNOSES:  Same  PROCEDURES PERFORMED:   1. Anterior lumbar arthrodesis, including removal of the disk for preparation of the interspace for fusion, L4-5 CPT 43821  2. Anterior lumbar arthrodesis, including removal of the disk for preparation of the interspace for fusion, L3-4 CPT 34730  3. Application of intervertebral biomechanical device, L4-5 CPT 83117  4. Application of intervertebral biomechanical device, L3-4 CPT 37399  5.  Posterior spinal fusion L3-L4 17991  6.  Posterior spinal fusion L4-L5 30996  7.  Segmental posterior spinal instrumentation L3, L4, L5 22842  8.  Bone marrow aspirate from iliac crest 20939     SURGEON: Rosalba Sloan DO      ASSISTANT: Jennie Harris PA-C.      NOTE: Please note as part of the procedure I utilized the services of an assistant, specifically, Jennie Harris PA-C. Jennie Harris PA-C participated in crucial portions of the operation. The use of Jennie Harris PA-C greatly reduced overall operative time, thereby reducing overall morbidity for the patient.      ANESTHESIA: General.      ESTIMATED BLOOD LOSS: 250 ml      SPECIMENS: * No orders in the log *     COMPLICATIONS: None apparent.      IMPLANTS: [employer]phate[employer] Battalion     DISPOSITION: Patient to the recovery room in stable condition.      INDICATIONS: The patient is a 44 y.o. with  spinal stenosis and degenerative scoliosis causing neurogenic claudication.  Patient tried and failed extensive conservative care.  I recommended staged lateral and posterior spinal fusion.  INFORMED CONSENT: I described the risks of surgery including but not limited to; bleeding, infection, blood clots, pulmonary embolus, heart attack, stroke, nerve damage causing complete or partial paralysis, dural tear causing spinal headache, nonunion, hardware complication, injury to the psoas muscle, femoral nerve injury causing  possible thigh weakness and numbness, need for further surgery, lack of guarantee, continued numbness, and continued weakness.  The patient many questions about these risks.  He would like to proceed to surgery.  The patient also signed surgical consent for the use of bone morphogenic protein.  Informed consent obtained.     DESCRIPTION OF PROCEDURE: After identifying the patient in the preoperative holding area, all labs and consent were found to be in order. RICHARD hose and SCDs were applied for thromboembolic prophylaxis. Her left side was marked with an indelible marker. The patient was transferred to the operative suite. In the OR, she was given the benefit of general anesthesia. He had neural monitoring leads placed. Meng catheter placed and was given Cleocin preoperatively. She was then carefully positioned in the lateral position with axillary roll. All bony prominences padded. He was taped to the table. The table was jackknifed to open up the exposure to the spine and presurgery x-rays were taken to confirm a good visualization of the spine. The surgical area was then prepped and draped in the usual sterile fashion. Timeout was performed. This was followed by use of fluoroscopy to identify the location of the L3-5 disks. I marked this on [place]an and then made a 7 cm incision with a #10 blade. I dissected with the Bovie down to the fascia of the oblique muscles, which was incised. I then entered the retroperitoneal space after incising the transversalis fascia. I then performed blunt finger dissection down to palpate the spine including the transverse process and the psoas muscle. I then directed triggered EMG probe into the disk space and had neuromonitoring checked for proximity to the femoral nerve or lumbar plexus.  I then took an x-ray showing a probe in a good position. I then placed dilators over this probe to target the L4-5 disk space and then placed in the retractor. I then affixed the retractor to the  bed, and opened it out and did further monitoring to check for the nerves in the surgical field. When only the disk space and the psoas was exposed, I incised the disk and started preparing the disk space for fusion. I used Sofia to release the annulus and disk material from the vertebral endplate. I used a pituitary to remove this disk. I used curettes to repair the cartilaginous endplates for fusion. I did trials and I trialed up to a size 10, which correlated with size 12 Battalion spacer. The disk space was irrigated. The size 12 Battalion spacer was filled with Infuse and allograft chips.  The spacer was then impacted with live fluoroscopy revealing excellent restoration of disk height and good stability. I then removed the . I inspected the wound for any active bleeding. I could not appreciate any.  I then carefully removed the retractor with the light source still in place to make sure there was no active bleeding or injury and I could not appreciate any.      Next,  I repeated the same procedure for the L3-L4  using the same size implant.    Next I made a Ignacio incision for pedicle screw instrumentation in the back.  I dissected with the Bovie through the fascia and developed a plane through the interval between the iliocostalis and longissimus muscle with blunt finger dissection.  I then used the [employer] single step instrument to place 6.5 x 40 mm pedicle screws at L5, L4, and L3.  Biplanar fluoroscopy was used to guide the pedicle screws.  Neuro monitoring was used and normal thresholds were achieved.  Final x-ray showed acceptable placement of hardware.  Final irrigation was performed followed by layer closure of both wounds.  Exparel was infiltrated into the tissues around the incisions.  Sterile dressings were applied.  The patient was awake from general anesthesia, transferred to the hospital bed, taken to recovery in stable condition.     Throughout the case Jennei Harris provided retraction  and hemostasis for safe completion of the operation.       DISPOSITION: The patient will be admitted for an overnight stay, and received pain control, antibiotic prophylaxis, and DVT prophylaxis.  I anticipate a 2 to 3-day hospital stay followed by discharge to home.

## 2019-07-10 NOTE — ANESTHESIA PREPROCEDURE EVALUATION
Anesthesia Evaluation     Patient summary reviewed                Airway   Mallampati: II  No difficulty expected  Dental      Pulmonary    Cardiovascular     ECG reviewed  Rhythm: regular        Neuro/Psych    ROS Comment: Traumatic fall. [facility], ortho injuries  GI/Hepatic/Renal/Endo    (+)   hepatitis C,     Musculoskeletal     Abdominal    Substance History      OB/GYN          Other                        Anesthesia Plan    ASA 3     general     Anesthetic plan, all risks, benefits, and alternatives have been provided, discussed and informed consent has been obtained with: patient.  Use of blood products discussed with patient .

## 2019-07-10 NOTE — BRIEF OP NOTE
DIRECT LATERAL SPINAL FUSION  Progress Note    Teo Reynoso  7/10/2019    Pre-op Diagnosis:   Lumbar instability L3-L5  Lumbar stenosis L3-L5       Post-Op Diagnosis Codes:  Same    Procedure/CPT® Codes:      Procedure(s):  STAGE 1 L3-5 DIRECT LATERAL INTERBODY FUSION WITH BONE MORPHAGENIC PROTEIN STAGE 2 POSTERIOR SPINAL FUSION L3-5  STAGE 2 POSTERIOR SPINAL FUSION    Surgeon(s):  Luther Navarrete DO Vemuri, Venu, DO    Anesthesia: General    Staff:   Circulator: Estela Briseno RN; Yuliana Frye RN  Radiology Technologist: Eddie Byrnes  Scrub Person: Marija Daily; Cintia Escamilla  Vendor Representative: Solomon Powell  Assistant: Jennie Harris PA    Estimated Blood Loss: 250 mL    Urine Voided: 1600 mL    Specimens:                None          Drains:   Urethral Catheter Silicone 16 Fr. (Active)       Findings: As above    Complications: None apparent      Luther Navarrete DO     Date: 7/10/2019  Time: 3:24 PM

## 2019-07-11 LAB
ANION GAP SERPL CALCULATED.3IONS-SCNC: 13 MMOL/L (ref 5–15)
BUN BLD-MCNC: 10 MG/DL (ref 6–20)
BUN/CREAT SERPL: 12.5 (ref 7–25)
CALCIUM SPEC-SCNC: 8.8 MG/DL (ref 8.6–10.5)
CHLORIDE SERPL-SCNC: 102 MMOL/L (ref 98–107)
CO2 SERPL-SCNC: 26 MMOL/L (ref 22–29)
CREAT BLD-MCNC: 0.8 MG/DL (ref 0.76–1.27)
GFR SERPL CREATININE-BSD FRML MDRD: 106 ML/MIN/1.73
GLUCOSE BLD-MCNC: 111 MG/DL (ref 65–99)
HCT VFR BLD AUTO: 37.4 % (ref 37.5–51)
HGB BLD-MCNC: 13 G/DL (ref 13–17.7)
POTASSIUM BLD-SCNC: 4 MMOL/L (ref 3.5–5.2)
SODIUM BLD-SCNC: 141 MMOL/L (ref 136–145)

## 2019-07-11 PROCEDURE — 25010000003 CEFAZOLIN IN DEXTROSE 2-4 GM/100ML-% SOLUTION: Performed by: ORTHOPAEDIC SURGERY

## 2019-07-11 PROCEDURE — 97110 THERAPEUTIC EXERCISES: CPT | Performed by: PHYSICAL THERAPIST

## 2019-07-11 PROCEDURE — 85014 HEMATOCRIT: CPT | Performed by: ORTHOPAEDIC SURGERY

## 2019-07-11 PROCEDURE — 97162 PT EVAL MOD COMPLEX 30 MIN: CPT | Performed by: PHYSICAL THERAPIST

## 2019-07-11 PROCEDURE — 80048 BASIC METABOLIC PNL TOTAL CA: CPT | Performed by: ORTHOPAEDIC SURGERY

## 2019-07-11 PROCEDURE — 85018 HEMOGLOBIN: CPT | Performed by: ORTHOPAEDIC SURGERY

## 2019-07-11 RX ORDER — OXYCODONE HCL 10 MG/1
10 TABLET, FILM COATED, EXTENDED RELEASE ORAL EVERY 12 HOURS SCHEDULED
Status: DISCONTINUED | OUTPATIENT
Start: 2019-07-11 | End: 2019-07-16 | Stop reason: HOSPADM

## 2019-07-11 RX ORDER — CYCLOBENZAPRINE HCL 10 MG
5 TABLET ORAL EVERY 8 HOURS SCHEDULED
Status: DISCONTINUED | OUTPATIENT
Start: 2019-07-11 | End: 2019-07-16 | Stop reason: HOSPADM

## 2019-07-11 RX ORDER — ACETAMINOPHEN 500 MG
1000 TABLET ORAL EVERY 6 HOURS PRN
Status: DISPENSED | OUTPATIENT
Start: 2019-07-11 | End: 2019-07-15

## 2019-07-11 RX ADMIN — HYDROMORPHONE HYDROCHLORIDE: 10 INJECTION INTRAMUSCULAR; INTRAVENOUS; SUBCUTANEOUS at 12:57

## 2019-07-11 RX ADMIN — GABAPENTIN 400 MG: 400 CAPSULE ORAL at 08:53

## 2019-07-11 RX ADMIN — CLONAZEPAM 1 MG: 1 TABLET ORAL at 20:59

## 2019-07-11 RX ADMIN — OXYCODONE HYDROCHLORIDE 10 MG: 10 TABLET, FILM COATED, EXTENDED RELEASE ORAL at 14:31

## 2019-07-11 RX ADMIN — SERTRALINE 100 MG: 100 TABLET, FILM COATED ORAL at 20:59

## 2019-07-11 RX ADMIN — VARENICLINE TARTRATE 1 MG: 0.5 TABLET, FILM COATED ORAL at 08:53

## 2019-07-11 RX ADMIN — HYDROMORPHONE HYDROCHLORIDE 4 MG: 4 TABLET ORAL at 22:52

## 2019-07-11 RX ADMIN — OXYCODONE HYDROCHLORIDE 10 MG: 10 TABLET, FILM COATED, EXTENDED RELEASE ORAL at 20:59

## 2019-07-11 RX ADMIN — CHOLECALCIFEROL TAB 10 MCG (400 UNIT) 400 UNITS: 10 TAB at 08:53

## 2019-07-11 RX ADMIN — GABAPENTIN 800 MG: 400 CAPSULE ORAL at 20:59

## 2019-07-11 RX ADMIN — SODIUM CHLORIDE, PRESERVATIVE FREE 3 ML: 5 INJECTION INTRAVENOUS at 20:58

## 2019-07-11 RX ADMIN — ACETAMINOPHEN 1000 MG: 500 TABLET, FILM COATED ORAL at 23:33

## 2019-07-11 RX ADMIN — CEFAZOLIN SODIUM 2 G: 2 INJECTION, SOLUTION INTRAVENOUS at 04:15

## 2019-07-11 RX ADMIN — HYDROMORPHONE HYDROCHLORIDE 4 MG: 4 TABLET ORAL at 16:38

## 2019-07-11 RX ADMIN — CEFAZOLIN SODIUM 2 G: 2 INJECTION, SOLUTION INTRAVENOUS at 11:01

## 2019-07-11 RX ADMIN — CLONAZEPAM 1 MG: 1 TABLET ORAL at 08:53

## 2019-07-11 RX ADMIN — PANTOPRAZOLE SODIUM 40 MG: 40 TABLET, DELAYED RELEASE ORAL at 06:54

## 2019-07-11 RX ADMIN — VARENICLINE TARTRATE 1 MG: 0.5 TABLET, FILM COATED ORAL at 20:59

## 2019-07-11 RX ADMIN — CYCLOBENZAPRINE 5 MG: 10 TABLET, FILM COATED ORAL at 22:48

## 2019-07-11 RX ADMIN — HYDROMORPHONE HYDROCHLORIDE 4 MG: 4 TABLET ORAL at 02:55

## 2019-07-11 RX ADMIN — OXCARBAZEPINE 150 MG: 150 TABLET, FILM COATED ORAL at 20:59

## 2019-07-11 RX ADMIN — SODIUM CHLORIDE 100 ML/HR: 4.5 INJECTION, SOLUTION INTRAVENOUS at 05:35

## 2019-07-11 RX ADMIN — HYDROMORPHONE HYDROCHLORIDE 4 MG: 4 TABLET ORAL at 06:54

## 2019-07-11 RX ADMIN — HYDROMORPHONE HYDROCHLORIDE 4 MG: 4 TABLET ORAL at 11:01

## 2019-07-11 RX ADMIN — GABAPENTIN 400 MG: 400 CAPSULE ORAL at 12:12

## 2019-07-11 NOTE — THERAPY TREATMENT NOTE
Acute Care - Physical Therapy Treatment Note  [facility]     Patient Name: Teo Reynoso  : 1975  MRN: [id number]  Today's Date: 2019             Admit Date: 7/10/2019    Visit Dx:  No diagnosis found.  Patient Active Problem List   Diagnosis   • Lumbar spinal stenosis       Therapy Treatment    Rehabilitation Treatment Summary     Row Name 19 1600             Treatment Time/Intention    Discipline  physical therapy assistant  -KH      Document Type  therapy note (daily note)  -PRISCILLA      Subjective Information  complains of;pain  -PRISCILLA      Mode of Treatment  physical therapy  -KH      Therapy Frequency (PT Clinical Impression)  daily  -KH      Patient Effort  good  -KH      Recorded by [KH] Kandi Syed, PT 19 1630      Row Name 19 1600             Cognitive Assessment/Intervention- PT/OT    Orientation Status (Cognition)  oriented x 4  -KH      Follows Commands (Cognition)  WNL  -KH      Recorded by [PRISCILLA] Kandi Syed, PT 19 1630      Row Name 19 1600             Bed Mobility Assessment/Treatment    Bed Mobility Assessment/Treatment  rolling right;supine-sit-supine;sidelying-sit  -KH      Rolling Right [place] (Bed Mobility)  contact guard  -PRISCILLA      Supine-Sit-Supine [place] (Bed Mobility)  contact guard  -PRISCILLA      Recorded by [PRISCILLA] Kandi Syed, PT 19 1630      Row Name 19 1600             Transfer Assessment/Treatment    Transfer Assessment/Treatment  sit-stand transfer;stand-sit transfer  -PRISCILLA      Recorded by [PRISCILLA] Kandi Syed, PT 19 1630      Row Name 19 1600             Sit-Stand Transfer    Sit-Stand [place] (Transfers)  contact guard  -PRISCILLA      Assistive Device (Sit-Stand Transfers)  walker, front-wheeled  -PRISCILLA      Recorded by [PRISCILLA] Kandi Syed, PT 19 1630      Row Name 19 1600             Stand-Sit Transfer    Stand-Sit [place] (Transfers)  contact guard  -KH       Assistive Device (Stand-Sit Transfers)  walker, front-wheeled  -KH      Recorded by [PRISCILLA] Kandi Syed, PT 07/11/19 1630      Row Name 07/11/19 1600             Gait/Stairs Assessment/Training    [place] Level (Gait)  contact guard  -KH      Assistive Device (Gait)  walker, front-wheeled  -KH      Distance in Feet (Gait)  55  -KH      Pattern (Gait)  step-to  -KH      Deviations/Abnormal Patterns (Gait)  antalgic;gait speed decreased;stride length decreased  -KH      Left Sided Gait Deviations  weight shift ability decreased;forward flexed posture  -KH      Recorded by [PRISCILLA] Kandi Syed, PT 07/11/19 1630      Row Name 07/11/19 1600             Therapeutic Exercise    Comment (Therapeutic Exercise)  BLE AP, LAQ, seated marching  -KH      Recorded by [PRISCILLA] Kandi Syed, PT 07/11/19 1630      Row Name 07/11/19 1600             Positioning and Restraints    Pre-Treatment Position  in bed  -KH      Post Treatment Position  chair  -KH      In Chair  sitting;call light within reach;encouraged to call for assist;with family/caregiver;with nsg  -KH      Recorded by [PRISCILLA] Kandi Syed, PT 07/11/19 1630      Row Name 07/11/19 1600             Pain Scale: Numbers Pre/Post-Treatment    Pain Scale: Numbers, Pretreatment  8/10  -KH      Pain Scale: Numbers, Post-Treatment  8/10  -KH      Pain Location - Orientation  lower  -KH      Pain Location  back  -KH      Recorded by [PRISCILLA] Kandi Syed, PT 07/11/19 1630      Row Name                Wound 07/10/19 1458 Left back incision    Wound - Properties Group Date first assessed: 07/10/19 [AS] Time first assessed: 1458 [AS] Side: Left [AS] Location: back [AS] Type: incision [AS] Recorded by:  [AS] Yuliana Frye RN 07/10/19 1458    Row Name                Wound 07/10/19 1458 back incision    Wound - Properties Group Date first assessed: 07/10/19 [AS] Time first assessed: 1458 [AS] Location: back [AS] Type: incision [AS] Recorded  by:  [AS] Yuliana Frye RN 07/10/19 1458    Row Name 07/11/19 1600             Plan of Care Review    Plan of Care Reviewed With  spouse;patient  -KH      Recorded by [] Kandi Syed, PT 07/11/19 1630      Row Name 07/11/19 1600             Outcome Summary/Treatment Plan (PT)    Anticipated Discharge Disposition (PT)  home with assist;skilled nursing facility  -KH      Recorded by [KH] Kandi Syed, PT 07/11/19 1630        User Key  (r) = Recorded By, (t) = Taken By, (c) = Cosigned By    Initials Name Effective Dates Discipline    Kandi Tam, PT 02/05/19 -  PT    AS Yuliana Frye RN 12/13/16 -  Nurse          Wound 07/10/19 1458 Left back incision (Active)   Dressing Appearance dry;intact;no drainage 7/11/2019 12:00 PM   Closure JESSICA 7/11/2019 12:00 PM   Base dressing in place, unable to visualize 7/11/2019 12:00 PM   Drainage Amount none 7/11/2019 12:00 PM   Dressing Care, Wound foam 7/10/2019  8:00 PM       Wound 07/10/19 1458 back incision (Active)   Dressing Appearance dry;intact;no drainage 7/11/2019 12:00 PM   Closure JESSICA 7/11/2019 12:00 PM   Base dressing in place, unable to visualize 7/11/2019 12:00 PM   Drainage Amount none 7/11/2019 12:00 PM   Dressing Care, Wound foam 7/10/2019  8:00 PM       Rehab Goal Summary     Row Name 07/11/19 1130             Physical Therapy Goals    Bed Mobility Goal Selection (PT)  bed mobility, PT goal 1  -KH      Transfer Goal Selection (PT)  transfer, PT goal 1  -KH      Gait Training Goal Selection (PT)  gait training, PT goal 1  -KH         Bed Mobility Goal 1 (PT)    Activity/Assistive Device (Bed Mobility Goal 1, PT)  bed mobility activities, all  -KH      [place] Level/Cues Needed (Bed Mobility Goal 1, PT)  independent  -KH      Time Frame (Bed Mobility Goal 1, PT)  5 days  -KH         Transfer Goal 1 (PT)    Activity/Assistive Device (Transfer Goal 1, PT)  transfers, all;walker, rolling  -KH      [place] Level/Cues Needed  (Transfer Goal 1, PT)  supervision required  -KH      Time Frame (Transfer Goal 1, PT)  5 days  -KH         Gait Training Goal 1 (PT)    Activity/Assistive Device (Gait Training Goal 1, PT)  gait (walking locomotion);walker, rolling  -      [place] Level (Gait Training Goal 1, PT)  supervision required  -KH      Distance (Gait Goal 1, PT)  150  -KH      Time Frame (Gait Training Goal 1, PT)  5 days  -         Patient Education Goal (PT)    Activity (Patient Education Goal, PT)  HEP, brace use, backl precautions  -      [place]/Cues/Accuracy (Memory Goal 2, PT)  demonstrates adequately  -KH      Time Frame (Patient Education Goal, PT)  5 days  -        User Key  (r) = Recorded By, (t) = Taken By, (c) = Cosigned By    Initials Name Provider Type Discipline    Kandi Tam, PT Physical Therapist PT          Physical Therapy Education     Title: PT OT SLP Therapies (Done)     Topic: Physical Therapy (Done)     Point: Mobility training (Done)     Learning Progress Summary           Patient Acceptance, E,TB, VU by PRISCILLA at 7/11/2019  4:31 PM                   Point: Home exercise program (Done)     Learning Progress Summary           Patient Acceptance, E,TB, VU by PRISCILLA at 7/11/2019  4:31 PM                   Point: Body mechanics (Done)     Learning Progress Summary           Patient Acceptance, E,TB, VU by PRISCILLA at 7/11/2019  4:31 PM                   Point: Precautions (Done)     Learning Progress Summary           Patient Acceptance, E,TB, VU by  at 7/11/2019  4:31 PM                               User Key     Initials Effective Dates Name Provider Type Discipline    PRISCILLA 02/05/19 -  Kandi Syed, PT Physical Therapist PT                PT Recommendation and Plan  Anticipated Discharge Disposition (PT): home with assist, skilled nursing facility  Planned Therapy Interventions (PT Eval): bed mobility training, gait training, home exercise program, patient/family education, ROM (range  of motion), stair training, strengthening, transfer training  Therapy Frequency (PT Clinical Impression): daily  Outcome Summary/Treatment Plan (PT)  Anticipated Discharge Disposition (PT): home with assist, skilled nursing facility  Plan of Care Reviewed With: spouse, patient  Outcome Summary: Pt is s/p lumbar fusion and presents with pain in his lower back and LLE and difficulty walking. Pt would benefit from PT to address these imapairments.   Outcome Measures     Row Name 07/11/19 1600             How much help from another person do you currently need...    Turning from your back to your side while in flat bed without using bedrails?  4  -KH      Moving from lying on back to sitting on the side of a flat bed without bedrails?  3  -KH      Moving to and from a bed to a chair (including a wheelchair)?  3  -KH      Standing up from a chair using your arms (e.g., wheelchair, bedside chair)?  3  -KH      Climbing 3-5 steps with a railing?  2  -KH      To walk in hospital room?  3  -KH      AM-PAC 6 Clicks Score (PT)  18  -KH         Functional Assessment    Outcome Measure Options  AM-PAC 6 Clicks Basic Mobility (PT)  -KH        User Key  (r) = Recorded By, (t) = Taken By, (c) = Cosigned By    Initials Name Provider Type    Kandi Tam, PT Physical Therapist         Time Calculation:   PT Charges     Row Name 07/11/19 1633 07/11/19 1139          Time Calculation    Start Time  1540  -KH  1110  -KH     Stop Time  1603  -KH  1131  -KH     Time Calculation (min)  23 min  -KH  21 min  -KH     PT Received On  07/11/19  -KH  07/11/19  -PRISCILLA     PT - Next Appointment  07/12/19  -KH  07/11/19  -PRISCILLA     PT Goal Re-Cert Due Date  --  07/16/19  -KH        Time Calculation- PT    Total Timed Code Minutes- PT  23 minute(s)  -KH  10 minute(s)  -PRISCILLA       User Key  (r) = Recorded By, (t) = Taken By, (c) = Cosigned By    Initials Name Provider Type    Kandi Tam, PT Physical Therapist        Therapy  Charges for Today     Code Description Service Date Service Provider Modifiers Qty    10680454640 HC PT EVAL MOD COMPLEXITY 2 7/11/2019 Kandi Syed, PT GP 1    36831269827 HC PT THER PROC EA 15 MIN 7/11/2019 Kandi Syed, PT GP 1    33236435231 HC PT THER PROC EA 15 MIN 7/11/2019 Kandi Syed, PT GP 2          PT G-Codes  Outcome Measure Options: AM-PAC 6 Clicks Basic Mobility (PT)  AM-PAC 6 Clicks Score (PT): 18    Kandi Syed, PT  7/11/2019
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
## 2019-07-11 NOTE — PLAN OF CARE
Problem: Patient Care Overview  Goal: Plan of Care Review  Outcome: Ongoing (interventions implemented as appropriate)   07/11/19 1526   Coping/Psychosocial   Plan of Care Reviewed With patient   Plan of Care Review   Progress no change   OTHER   Outcome Summary VSS. Optifoam dressings clean dry and intact. Pt alert and oriented. PCA in use. Pt educated on PCA use. Will continue to monitor.      Goal: Individualization and Mutuality  Outcome: Ongoing (interventions implemented as appropriate)    Goal: Discharge Needs Assessment  Outcome: Ongoing (interventions implemented as appropriate)    Goal: Interprofessional Rounds/Family Conf  Outcome: Ongoing (interventions implemented as appropriate)      Problem: Pain, Chronic (Adult)  Goal: Identify Related Risk Factors and Signs and Symptoms  Outcome: Ongoing (interventions implemented as appropriate)      Problem: Fall Risk (Adult)  Goal: Identify Related Risk Factors and Signs and Symptoms  Outcome: Outcome(s) achieved Date Met: 07/11/19    Goal: Absence of Fall  Outcome: Ongoing (interventions implemented as appropriate)      Problem: Laminectomy/Foraminotomy/Discectomy (Adult)  Goal: Signs and Symptoms of Listed Potential Problems Will be Absent, Minimized or Managed (Laminectomy/Foraminotomy/Discectomy)  Outcome: Ongoing (interventions implemented as appropriate)    Goal: Anesthesia/Sedation Recovery  Outcome: Ongoing (interventions implemented as appropriate)

## 2019-07-11 NOTE — PROGRESS NOTES
Discharge Planning Assessment  [facility]     Patient Name: Teo Reynoso  MRN: [id number]  Today's Date: 7/11/2019    Admit Date: 7/10/2019    Discharge Needs Assessment     Row Name 07/11/19 1213       Living Environment    Lives With  spouse    Current Living Arrangements  home/apartment/condo    Primary Care Provided by  self    Provides Primary Care For  no one    Family Caregiver if Needed  spouse    Family Caregiver Names  lEin    Quality of Family Relationships  helpful;involved       Resource/Environmental Concerns    Resource/Environmental Concerns  none    Home Accessibility Concerns  stairs to enter home       Transition Planning    Patient/Family Anticipates Transition to  inpatient rehabilitation facility    Patient/Family Anticipated Services at Transition  none    Transportation Anticipated  family or friend will provide       Discharge Needs Assessment    Readmission Within the Last 30 Days  no previous admission in last 30 days    Concerns to be Addressed  basic needs;discharge planning    Equipment Currently Used at Home  crutches;walker, rolling    Anticipated Changes Related to Illness  none    Equipment Needed After Discharge  none    Discharge Facility/Level of Care Needs  nursing facility, skilled    Offered/Gave Vendor List  yes    Discharge Coordination/Progress  SNF referrals pending         Discharge Plan     Row Name 07/11/19 1212       Plan    Plan  SNF referrals pending; PT eval pending     Patient/Family in Agreement with Plan  yes    Plan Comments  Met with pt and pt's spouse Elin foy. Confirmed facesheet correct. Explained role of CCP. Pt reports he lives with his wife in a boarding house off [place] [facility]. pt reports he has a walker and crutches at home. He has never used HH or SNF. He reports his PCP is correct and uses [facility] on [address] with no issues. Pt reports he doesn't feel he can return home at d/c safely and will need SNF. He is open to any place that can take  his insurance. Referrals to [facility] and [facility] placed in basket. Spoke with Manolo/Karma who will see if pt has skilled benefits. FATIMAH Rutledge        Destination      Service Provider Request Status Selected Services Address Phone Number Fax Number    [facility] Pending - Request Sent N/A [address], [facility] [id number] [phone]    JOSUE WOODS Pending - Request Sent N/A [address], [facility] [address] [phone]    SHAWNEE GAMBINO Pending - [facility] N[facility] [address] [id number] [phone]    [facility] Pending - Request Sent N/A [address] [facility] [id number] [phone]    [facility] [address] [address] [phone]      Durable Medical Equipment      No service coordination in this encounter.      Dialysis/Infusion      No service coordination in this encounter.      Home Medical Care      No service coordination in this encounter.      Therapy      No service coordination in this encounter.      Community Resources      No service coordination in this encounter.          Demographic Summary     Row Name 07/11/19 1213       General Information    Admission Type  inpatient    Arrived From  home    Reason for Consult  discharge planning    Preferred Language  English     Used During This Interaction  no       Contact Information    Permission Granted to Share Info With  facility ;family/designee        Functional Status     Row Name 07/11/19 1213       Functional Status    Usual Activity Tolerance  moderate    Current Activity Tolerance  moderate       Functional Status, IADL    Medications  assistive person    Meal Preparation  assistive person    Housekeeping  assistive person    Laundry  assistive person    Shopping  assistive person       Mental Status     General Appearance WDL  WDL       Mental Status Summary    Recent Changes in Mental Status/Cognitive Functioning  no changes        Psychosocial    No documentation.       Abuse/Neglect    No documentation.       Legal    No documentation.       Substance Abuse    No documentation.       Patient Forms    No documentation.           Lauryn Mendoza

## 2019-07-11 NOTE — CONSULTS
Pain management consult  He is postop day 1 status paced lumbar 1 through post lumbar 3 through 5 fusion.  He is in severe postoperative pain today.  Scheduled Meds:  cholecalciferol 400 Units Oral Daily   clonazePAM 1 mg Oral BID   cyclobenzaprine 5 mg Oral Q8H   gabapentin 400 mg Oral Daily With Breakfast   gabapentin 400 mg Oral Daily With Lunch   gabapentin 800 mg Oral Nightly   OXcarbazepine 150 mg Oral Nightly   oxyCODONE 10 mg Oral Q12H   pantoprazole 40 mg Oral QAM   sertraline 100 mg Oral Nightly   sodium chloride 3 mL Intravenous Q12H   varenicline 1 mg Oral BID     Continuous Infusions:  HYDROmorphone HCl-NaCl     sodium chloride 100 mL/hr Last Rate: 100 mL/hr (07/11/19 4698)     PRN Meds:.•  bisacodyl  •  bisacodyl  •  docusate sodium  •  HYDROmorphone  •  magnesium hydroxide  •  naloxone  •  ondansetron **OR** ondansetron  •  polyethylene glycol  •  promethazine **OR** promethazine **OR** promethazine  •  sennosides-docusate sodium  •  sodium chloride    He is already on Neurontin, Zoloft, Flexeril, Robaxin.  His PCA is being discontinued and he is being started on OxyContin today.    Diagnosis postoperative pain status post lumbar 3 through 5 fusion  Recommendations  Tylenol 1000 mg p.o. every 8 hours  He is already on Zoloft and allergic to Cymbalta, so I am not sure adding another antidepressant would help.  His narcotic regimen seems adequate   At home he was on Dilaudid 4 mg 1-2 p.o. every 6 hours, and this should be restarted as his maintenance regimen.  The OxyContin can still be added on top of this as a postop pain therapy.  Ketamine would be a useful adjunct in the situation if his pain remains uncontrolled, if possible 25 to 50 mg of ketamine intramuscular every 4 to 6 hours may be helpful.

## 2019-07-11 NOTE — PROGRESS NOTES
Orthopedic Spine Progress Note    Subjective     Post-Operative Day: 1 post-spine procedure L3-L5 direct lateral and posterior fusion    Systemic or Specific Complaints: Pain Control.  Poorly controlled back pain.  He still complains of bilateral pain in his leg similar to before surgery.  No new pain or numbness in the legs.  He has not been ambulatory    Objective     Vital signs in last 24 hours:  Temp:  [97 °F (36.1 °C)-98.7 °F (37.1 °C)] 98.2 °F (36.8 °C)  Heart Rate:  [58-84] 84  Resp:  [12-20] 16  BP: (106-131)/(61-96) 113/65    General: alert, appears stated age and cooperative   Neurovascular: stable   Wound: Wound clean and dry no evidence of infection.   Range of Motion: limited   DVT Exam: No evidence of DVT seen on physical exam.     Data Review  CBC:  Results from last 7 days   Lab Units 07/11/19  0405 07/05/19  1215   WBC 10*3/mm3  --  8.20   RBC 10*6/mm3  --  4.71   HEMOGLOBIN g/dL 13.0 15.0   HEMATOCRIT % 37.4* 45.1   PLATELETS 10*3/mm3  --  325     Lab Results   Component Value Date    GLUCOSE 111 (H) 07/11/2019    BUN 10 07/11/2019    CREATININE 0.80 07/11/2019    EGFRIFNONA 106 07/11/2019    BCR 12.5 07/11/2019    K 4.0 07/11/2019    CO2 26.0 07/11/2019    CALCIUM 8.8 07/11/2019    ALBUMIN 4.20 07/05/2019    LABIL2 1.9 10/01/2018    AST 14 07/05/2019    ALT 9 07/05/2019         Assessment/Plan     Status post-spine procedure:  Continues current post-op course  His PCA expires today.  His pain is poorly controlled.  I will start him on OxyContin 10 mg extended release every 12 hours with a lot of 4 mg every 6 hours as needed for breakthrough pain.  Consult pain management.  CCP consult for discharge planning.  The patient has an unstable home environment which places him at increased risk for postoperative complications particularly infection.  He would benefit from subacute rehabilitation    Activity: out of bed and ambulate    Weight Bearing: FWB     LOS: 1 day     Luther Navarrete DO    Date:  7/11/2019

## 2019-07-11 NOTE — DISCHARGE PLACEMENT REQUEST
"Teo Napier (44 y.o. Male)     Date of Birth Social Security Number Address Home Phone MRN    1975  [address] [facility] [phone] [id number]    Yazidism Marital Status          Adventism        Admission Date Admission Type Admitting Provider Attending Provider Department, Room/Bed    7/10/19 Elective Luther Navarrete, Luther Vaughn DO [address], P787/1    Discharge Date Discharge Disposition Discharge Destination                       Attending Provider:  Luther Navarrete DO    Allergies:  Cymbalta [Duloxetine Hcl]    Isolation:  None   Infection:  None   Code Status:  CPR    Ht:  172.7 cm (68\")   Wt:  75 kg (165 lb 4 oz)    Admission Cmt:  None   Principal Problem:  None                Active Insurance as of 7/10/2019     Primary Coverage     Payor Plan Insurance Group Employer/Plan Group    [employer] [place] [employer] [place]      Payor Plan Address Payor Plan Phone Number Payor Plan Fax Number Effective Dates    [address] [address] [phone]  6/27/2019 - None Entered    [facility] [address]       Subscriber Name Subscriber Birth Date Member ID       TEO NAPIER DAVIDA 1975 [id number]                 Emergency Contacts      (Rel.) Home Phone Work Phone Mobile Phone    PASTOR NAPIER (Spouse) [phone] -- [phone]    AIKARISSA DUVALLI (Sister) -- -- [phone]              "
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
## 2019-07-11 NOTE — PLAN OF CARE
Problem: Patient Care Overview  Goal: Plan of Care Review  Outcome: Ongoing (interventions implemented as appropriate)   07/11/19 0521   Plan of Care Review   Progress no change   OTHER   Outcome Summary VSS, Optifoam dsgs to back & left side c/d/i, left side of tongue swollen & bruised, po Dilaudid x2, pt frustrated with PCA - keeps locking r/t max 1 hr limit reached - attempts to explain proper use unsuccessful, wife at bedside     Goal: Individualization and Mutuality  Outcome: Ongoing (interventions implemented as appropriate)    Goal: Discharge Needs Assessment  Outcome: Ongoing (interventions implemented as appropriate)    Goal: Interprofessional Rounds/Family Conf  Outcome: Ongoing (interventions implemented as appropriate)      Problem: Pain, Chronic (Adult)  Goal: Identify Related Risk Factors and Signs and Symptoms  Outcome: Ongoing (interventions implemented as appropriate)    Goal: Acceptable Pain/Comfort Level and Functional Ability  Outcome: Ongoing (interventions implemented as appropriate)      Problem: Fall Risk (Adult)  Goal: Identify Related Risk Factors and Signs and Symptoms  Outcome: Ongoing (interventions implemented as appropriate)    Goal: Absence of Fall  Outcome: Ongoing (interventions implemented as appropriate)      Problem: Laminectomy/Foraminotomy/Discectomy (Adult)  Goal: Signs and Symptoms of Listed Potential Problems Will be Absent, Minimized or Managed (Laminectomy/Foraminotomy/Discectomy)  Outcome: Ongoing (interventions implemented as appropriate)    Goal: Anesthesia/Sedation Recovery  Outcome: Ongoing (interventions implemented as appropriate)

## 2019-07-11 NOTE — PLAN OF CARE
Problem: Patient Care Overview  Goal: Plan of Care Review   07/11/19 1631   OTHER   Outcome Summary Pt is s/p lumbar fusion and presents with pain in his lower back and LLE and difficulty walking. Pt would benefit from PT to address these imapairments.

## 2019-07-12 PROCEDURE — 97110 THERAPEUTIC EXERCISES: CPT

## 2019-07-12 RX ORDER — HYDROMORPHONE HYDROCHLORIDE 2 MG/1
8 TABLET ORAL EVERY 4 HOURS PRN
Status: DISCONTINUED | OUTPATIENT
Start: 2019-07-12 | End: 2019-07-16 | Stop reason: HOSPADM

## 2019-07-12 RX ADMIN — GABAPENTIN 400 MG: 400 CAPSULE ORAL at 08:42

## 2019-07-12 RX ADMIN — ACETAMINOPHEN 1000 MG: 500 TABLET, FILM COATED ORAL at 22:38

## 2019-07-12 RX ADMIN — ACETAMINOPHEN 1000 MG: 500 TABLET, FILM COATED ORAL at 12:30

## 2019-07-12 RX ADMIN — HYDROMORPHONE HYDROCHLORIDE 4 MG: 4 TABLET ORAL at 12:30

## 2019-07-12 RX ADMIN — GABAPENTIN 800 MG: 400 CAPSULE ORAL at 20:26

## 2019-07-12 RX ADMIN — GABAPENTIN 400 MG: 400 CAPSULE ORAL at 12:30

## 2019-07-12 RX ADMIN — CLONAZEPAM 1 MG: 1 TABLET ORAL at 08:42

## 2019-07-12 RX ADMIN — HYDROMORPHONE HYDROCHLORIDE 4 MG: 4 TABLET ORAL at 08:42

## 2019-07-12 RX ADMIN — SERTRALINE 100 MG: 100 TABLET, FILM COATED ORAL at 20:26

## 2019-07-12 RX ADMIN — VARENICLINE TARTRATE 1 MG: 0.5 TABLET, FILM COATED ORAL at 08:42

## 2019-07-12 RX ADMIN — HYDROMORPHONE HYDROCHLORIDE 8 MG: 4 TABLET ORAL at 16:57

## 2019-07-12 RX ADMIN — OXCARBAZEPINE 150 MG: 150 TABLET, FILM COATED ORAL at 20:26

## 2019-07-12 RX ADMIN — OXYCODONE HYDROCHLORIDE 10 MG: 10 TABLET, FILM COATED, EXTENDED RELEASE ORAL at 08:42

## 2019-07-12 RX ADMIN — OXYCODONE HYDROCHLORIDE 10 MG: 10 TABLET, FILM COATED, EXTENDED RELEASE ORAL at 20:26

## 2019-07-12 RX ADMIN — CYCLOBENZAPRINE 5 MG: 10 TABLET, FILM COATED ORAL at 21:14

## 2019-07-12 RX ADMIN — CHOLECALCIFEROL TAB 10 MCG (400 UNIT) 400 UNITS: 10 TAB at 08:42

## 2019-07-12 RX ADMIN — HYDROMORPHONE HYDROCHLORIDE 4 MG: 4 TABLET ORAL at 02:59

## 2019-07-12 RX ADMIN — HYDROMORPHONE HYDROCHLORIDE 8 MG: 4 TABLET ORAL at 21:14

## 2019-07-12 RX ADMIN — SODIUM CHLORIDE 100 ML/HR: 4.5 INJECTION, SOLUTION INTRAVENOUS at 04:00

## 2019-07-12 RX ADMIN — PANTOPRAZOLE SODIUM 40 MG: 40 TABLET, DELAYED RELEASE ORAL at 06:44

## 2019-07-12 RX ADMIN — CLONAZEPAM 1 MG: 1 TABLET ORAL at 20:26

## 2019-07-12 RX ADMIN — CYCLOBENZAPRINE 5 MG: 10 TABLET, FILM COATED ORAL at 06:44

## 2019-07-12 RX ADMIN — VARENICLINE TARTRATE 1 MG: 0.5 TABLET, FILM COATED ORAL at 20:26

## 2019-07-12 RX ADMIN — SODIUM CHLORIDE, PRESERVATIVE FREE 3 ML: 5 INJECTION INTRAVENOUS at 08:44

## 2019-07-12 NOTE — PLAN OF CARE
Problem: Patient Care Overview  Goal: Plan of Care Review   07/12/19 1144   OTHER   Outcome Summary Pt reports he is feeling terrible and adamantly refuses out of bed mobility despite education of importance. Educated on supine exercises to complete on own to improve mobility. Will continue to progress as tolerated.
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
## 2019-07-12 NOTE — PLAN OF CARE
Problem: Patient Care Overview  Goal: Plan of Care Review  Outcome: Ongoing (interventions implemented as appropriate)   07/12/19 6900   Coping/Psychosocial   Plan of Care Reviewed With patient   OTHER   Outcome Summary Pt VSS, Tmax 102,treated with prn Tylenol, Md aware, incouraging IS, pain controlled with PO pain medication, dressing intact, encouraged OOB, Will contl to monitor.       Problem: Pain, Chronic (Adult)  Goal: Identify Related Risk Factors and Signs and Symptoms  Outcome: Outcome(s) achieved Date Met: 07/12/19    Goal: Acceptable Pain/Comfort Level and Functional Ability  Outcome: Ongoing (interventions implemented as appropriate)      Problem: Fall Risk (Adult)  Goal: Absence of Fall  Outcome: Ongoing (interventions implemented as appropriate)

## 2019-07-12 NOTE — PROGRESS NOTES
Continued Stay Note  [facility]     Patient Name: Teo Reynoso  MRN: [id number]  Today's Date: 7/12/2019    Admit Date: 7/10/2019    Discharge Plan     Row Name 07/12/19 1352       Plan    Plan  [facility]    Patient/Family in Agreement with Plan  yes    Plan Comments  Per Hoang, pt is approved and has bed available at [facility]. Partial packet will be on chart if the pt is to d/c over the weekend.        Discharge Codes    No documentation.             Anne Albert RN

## 2019-07-12 NOTE — THERAPY TREATMENT NOTE
Acute Care - Physical Therapy Treatment Note  [facility]     Patient Name: Teo Reynoso  : 1975  MRN: [id number]  Today's Date: 2019             Admit Date: 7/10/2019    Visit Dx:  No diagnosis found.  Patient Active Problem List   Diagnosis   • Lumbar spinal stenosis       Therapy Treatment    Rehabilitation Treatment Summary     Row Name 19 1142             Treatment Time/Intention    Discipline  physical therapist  -MA      Document Type  therapy note (daily note)  -MA      Subjective Information  complains of;pain  -MA      Mode of Treatment  physical therapy;individual therapy  -MA      Patient/Family Observations  supine in bed, reports he feels terrible and is unable to move  -MA      Patient Effort  fair  -MA      Comment  max encouragment and education on importance of mobility given to pt to get out of bed, pt refused   -MA      Recorded by [MA] Jennie Salguero, PT 19 1144      Row Name 19 1142             Vital Signs    O2 Delivery Pre Treatment  supplemental O2  -MA      Recorded by [MA] Jennie Salguero, PT 19 1144      Row Name 19 1142             Cognitive Assessment/Intervention- PT/OT    Orientation Status (Cognition)  oriented x 4  -MA      Follows Commands (Cognition)  WNL  -MA      Personal Safety Interventions  fall prevention program maintained;gait belt;muscle strengthening facilitated;nonskid shoes/slippers when out of bed;supervised activity  -MA      Recorded by [MA] Jennie Salguero, PT 19 1144      Row Name 19 1142             Bed Mobility Assessment/Treatment    Comment (Bed Mobility)  pt adamantly refused  -MA      Recorded by [MA] Jennie Salguero, PT 19 1144      Row Name 19 1142             Therapeutic Exercise    Comment (Therapeutic Exercise)  Hip abduction, ankle pumps, glute sets, quad sets, heel slides, x10   -MA      Recorded by [MA] Jennie Salguero, PT 19 1144      Row Name 19 1142             Positioning and  Restraints    Pre-Treatment Position  in bed  -MA      Post Treatment Position  bed  -MA      In Bed  notified nsg;supine;call light within reach;encouraged to call for assist;exit alarm on  -MA      Recorded by [MA] Jennie Salguero, PT 07/12/19 1144      Row Name 07/12/19 1142             Pain Scale: Numbers Pre/Post-Treatment    Pain Scale: Numbers, Pretreatment  8/10  -MA      Pain Scale: Numbers, Post-Treatment  8/10  -MA      Pain Location - Orientation  lower  -MA      Pain Location  back  -MA      Pain Intervention(s)  Repositioned;Ambulation/increased activity  -MA      Recorded by [MA] Jennie Salguero, PT 07/12/19 1144      Row Name                Wound 07/10/19 1458 Left back incision    Wound - Properties Group Date first assessed: 07/10/19 [AS] Time first assessed: 1458 [AS] Side: Left [AS] Location: back [AS] Type: incision [AS] Recorded by:  [AS] Yuliana Frye RN 07/10/19 1458    Row Name                Wound 07/10/19 1458 back incision    Wound - Properties Group Date first assessed: 07/10/19 [AS] Time first assessed: 1458 [AS] Location: back [AS] Type: incision [AS] Recorded by:  [AS] Yuliana Frye RN 07/10/19 1458      User Key  (r) = Recorded By, (t) = Taken By, (c) = Cosigned By    Initials Name Effective Dates Discipline    AS Yuliana Frye RN 12/13/16 -  Nurse    Jennie Pulido, PT 10/19/18 -  PT          Wound 07/10/19 1458 Left back incision (Active)   Dressing Appearance dry;intact;no drainage 7/11/2019  8:00 PM   Closure JESSICA 7/11/2019  8:00 PM   Base dressing in place, unable to visualize 7/11/2019  8:00 PM   Drainage Amount none 7/11/2019  8:00 PM   Dressing Care, Wound foam 7/11/2019  8:00 PM       Wound 07/10/19 1458 back incision (Active)   Dressing Appearance dry;intact;no drainage 7/11/2019  8:00 PM   Closure JESSICA 7/11/2019  8:00 PM   Base dressing in place, unable to visualize 7/11/2019  8:00 PM   Drainage Amount none 7/11/2019  8:00 PM   Dressing Care, Wound foam 7/11/2019  8:00 PM            Physical Therapy Education     Title: PT OT SLP Therapies (Done)     Topic: Physical Therapy (Done)     Point: Mobility training (Done)     Learning Progress Summary           Patient Acceptance, E, VU,NR by MA at 7/12/2019 11:44 AM    Acceptance, E,TB, VU by  at 7/11/2019  4:31 PM                   Point: Home exercise program (Done)     Learning Progress Summary           Patient Acceptance, E, VU,NR by MA at 7/12/2019 11:44 AM    Acceptance, E,TB, VU by  at 7/11/2019  4:31 PM                   Point: Body mechanics (Done)     Learning Progress Summary           Patient Acceptance, E, VU,NR by MA at 7/12/2019 11:44 AM    Acceptance, E,TB, VU by  at 7/11/2019  4:31 PM                   Point: Precautions (Done)     Learning Progress Summary           Patient Acceptance, E, VU,NR by MA at 7/12/2019 11:44 AM    Acceptance, E,TB, VU by  at 7/11/2019  4:31 PM                               User Key     Initials Effective Dates Name Provider Type Discipline     02/05/19 -  Kandi Syed, PT Physical Therapist PT    MA 10/19/18 -  Jennie Salguero, PT Physical Therapist PT                PT Recommendation and Plan     Outcome Summary: Pt reports he is feeling terrible and adamantly refuses out of bed mobility despite education of importance. Educated on supine exercises to complete on own to improve mobility. Will continue to progress as tolerated.  Outcome Measures     Row Name 07/12/19 1100 07/11/19 1600          How much help from another person do you currently need...    Turning from your back to your side while in flat bed without using bedrails?  4  -MA  4  -KH     Moving from lying on back to sitting on the side of a flat bed without bedrails?  3  -MA  3  -KH     Moving to and from a bed to a chair (including a wheelchair)?  3  -MA  3  -KH     Standing up from a chair using your arms (e.g., wheelchair, bedside chair)?  3  -MA  3  -KH     Climbing 3-5 steps with a railing?  2  -MA  2  -KH      To walk in hospital room?  3  -MA  3  -KH     AM-PAC 6 Clicks Score (PT)  18  -MA  18  -KH        Functional Assessment    Outcome Measure Options  AM-PAC 6 Clicks Basic Mobility (PT)  -MA  AM-PAC 6 Clicks Basic Mobility (PT)  -       User Key  (r) = Recorded By, (t) = Taken By, (c) = Cosigned By    Initials Name Provider Type     Kandi Syed, PT Physical Therapist    Jennie Pulido, PT Physical Therapist         Time Calculation:   PT Charges     Row Name 07/12/19 1147             Time Calculation    Start Time  1132  -MA      Stop Time  1141  -MA      Time Calculation (min)  9 min  -MA      PT Received On  07/12/19  -MA      PT - Next Appointment  07/13/19  -MA         Time Calculation- PT    Total Timed Code Minutes- PT  9 minute(s)  -MA        User Key  (r) = Recorded By, (t) = Taken By, (c) = Cosigned By    Initials Name Provider Type    Jennie Pulido, PT Physical Therapist        Therapy Charges for Today     Code Description Service Date Service Provider Modifiers Qty    [id number] HC PT THER PROC EA 15 MIN 7/12/2019 Jennie Salguero, PT GP 1          PT G-Codes  Outcome Measure Options: AM-PAC 6 Clicks Basic Mobility (PT)  AM-PAC 6 Clicks Score (PT): 18    Jennie Salguero PT  7/12/2019

## 2019-07-12 NOTE — PAYOR COMM NOTE
"UR CONTACT:  CÉSAR P: [phone]       F: [phone]        Teo Napier (44 y.o. Male)     Date of Birth Social Security Number Address Home Phone MRN    [id number]  [id number] [facility] [address] [phone] [id number]    [employer] Marital Status          Jewish        Admission Date Admission Type Admitting Provider Attending Provider Department, Room/Bed    7/10/19 Elective Luther Navarrete, Luther Vaughn DO [address], 87/1    Discharge Date Discharge Disposition Discharge Destination                       Attending Provider:  Luther Navarrete DO    Allergies:  Cymbalta [Duloxetine Hcl]    Isolation:  None   Infection:  None   Code Status:  CPR    Ht:  172.7 cm (68\")   Wt:  75 kg (165 lb 4 oz)    Admission Cmt:  None   Principal Problem:  None                Active Insurance as of 7/10/2019     Primary Coverage     Payor Plan Insurance Group Employer/Plan Group    [employer] [place] [employer] [place]      Payor Plan Address Payor Plan Phone Number Payor Plan Fax Number Effective Dates    [address] [address] [phone]  6/27/2019 - None Entered    [facility] [id number]       Subscriber Name Subscriber Birth Date Member ID       ETO NAPIER 1975 [id number]                 Emergency Contacts      (Rel.) Home Phone Work Phone Mobile Phone    PASTOR NAPIER (Spouse) [phone] -- [phone]    HARRY CLOUD (Sister) -- -- [phone]            ICU Vital Signs     Row Name 07/12/19 0646 07/12/19 0300 07/11/19 2253 07/11/19 2000 07/11/19 1900       Vitals    Temp  100 °F (37.8 °C) Pt nrse notified.  99.9 °F (37.7 °C)  101 °F (38.3 °C)  (Abnormal)   --  99.6 °F (37.6 °C)    Temp src  Oral  Oral  Oral  --  Oral    Pulse  83  89  99  --  89    Heart Rate Source  Monitor  Monitor  Monitor  --  Monitor    Resp  16  16  16  --  16    Resp Rate Source  Visual  Visual  Visual  --  Visual    BP  134/81  130/76  145/83  --  129/73    BP Location  Left arm  Right " arm  Left arm  --  Left arm    BP Method  Automatic  Automatic  Automatic  --  Automatic    Patient Position  Lying  Lying  Lying  --  Lying       Oxygen Therapy    SpO2  96 %  96 %  96 %  --  94 %    Pulse Oximetry Type  Continuous  --  Continuous  --  --    Device (Oxygen Therapy)  nasal cannula  --  room air  room air  --    Flow (L/min)  2  --  --  --  --     Lines, Drains & Airways    Active LDAs     Name:   Placement date:   Placement time:   Site:   Days:    Peripheral IV 07/10/19 0915 Right;Anterior Forearm   07/10/19    0915    Forearm   1                Hospital Medications (active)       Dose Frequency Start End    acetaminophen (TYLENOL) tablet 1,000 mg 1,000 mg Every 6 Hours PRN 7/11/2019 7/15/2019    Sig - Route: Take 2 tablets by mouth Every 6 (Six) Hours As Needed for Mild Pain . - Oral    bisacodyl (DULCOLAX) EC tablet 5 mg 5 mg Daily PRN 7/10/2019     Sig - Route: Take 1 tablet by mouth Daily As Needed for Constipation. - Oral    bisacodyl (DULCOLAX) suppository 10 mg 10 mg Daily PRN 7/10/2019     Sig - Route: Insert 1 suppository into the rectum Daily As Needed for Constipation. - Rectal    ceFAZolin in dextrose (ANCEF) IVPB solution 2 g 2 g Every 8 Hours 7/10/2019 7/11/2019    Sig - Route: Infuse 100 mL into a venous catheter Every 8 (Eight) Hours. - Intravenous    cholecalciferol (VITAMIN D3) tablet 400 Units 400 Units Daily 7/10/2019     Sig - Route: Take 1 tablet by mouth Daily. - Oral    clonazePAM (KlonoPIN) tablet 1 mg 1 mg 2 Times Daily 7/10/2019     Sig - Route: Take 1 tablet by mouth 2 (Two) Times a Day. - Oral    cyclobenzaprine (FLEXERIL) tablet 5 mg 5 mg Every 8 Hours Scheduled 7/11/2019     Sig - Route: Take 0.5 tablets by mouth Every 8 (Eight) Hours. - Oral    docusate sodium (COLACE) capsule 100 mg 100 mg 2 Times Daily PRN 7/10/2019     Sig - Route: Take 1 capsule by mouth 2 (Two) Times a Day As Needed for Constipation. - Oral    gabapentin (NEURONTIN) capsule 400 mg 400 mg Daily  "With Breakfast 7/11/2019     Sig - Route: Take 1 capsule by mouth Daily With Breakfast. - Oral    gabapentin (NEURONTIN) capsule 400 mg 400 mg Daily With Lunch 7/10/2019     Sig - Route: Take 1 capsule by mouth Daily With Lunch. - Oral    gabapentin (NEURONTIN) capsule 800 mg 800 mg Nightly 7/10/2019     Sig - Route: Take 2 capsules by mouth Every Night. - Oral    HYDROmorphone (DILAUDID) PCA 0.2 mg/ml 50 mL syringe  Continuous 7/10/2019 7/11/2019    Sig - Route: Infuse  into a venous catheter Continuous. - Intravenous    HYDROmorphone (DILAUDID) tablet 4 mg 4 mg Every 4 Hours PRN 7/10/2019     Sig - Route: Take 1 tablet by mouth Every 4 (Four) Hours As Needed for Moderate Pain  or Severe Pain . - Oral    magnesium hydroxide (MILK OF MAGNESIA) suspension 2400 mg/10mL 10 mL 10 mL Daily PRN 7/10/2019     Sig - Route: Take 10 mL by mouth Daily As Needed for Constipation. - Oral    naloxone (NARCAN) injection 0.1 mg 0.1 mg Every 5 Minutes PRN 7/10/2019     Sig - Route: Infuse 0.25 mL into a venous catheter Every 5 (Five) Minutes As Needed for Opioid Reversal or Respiratory Depression (see administration instructions). - Intravenous    ondansetron (ZOFRAN) injection 4 mg 4 mg Every 6 Hours PRN 7/10/2019     Sig - Route: Infuse 2 mL into a venous catheter Every 6 (Six) Hours As Needed for Nausea or Vomiting. - Intravenous    Linked Group 1:  \"Or\" Linked Group Details        ondansetron (ZOFRAN) tablet 4 mg 4 mg Every 6 Hours PRN 7/10/2019     Sig - Route: Take 1 tablet by mouth Every 6 (Six) Hours As Needed for Nausea or Vomiting. - Oral    Linked Group 1:  \"Or\" Linked Group Details        OXcarbazepine (TRILEPTAL) tablet 150 mg 150 mg Nightly 7/10/2019     Sig - Route: Take 1 tablet by mouth Every Night. - Oral    oxyCODONE (oxyCONTIN) 12 hr tablet 10 mg 10 mg Every 12 Hours Scheduled 7/11/2019 7/21/2019    Sig - Route: Take 1 tablet by mouth Every 12 (Twelve) Hours. - Oral    pantoprazole (PROTONIX) EC tablet 40 mg 40 " "mg Every Morning 7/11/2019     Sig - Route: Take 1 tablet by mouth Every Morning. - Oral    polyethylene glycol 3350 powder (packet) 17 g Daily PRN 7/10/2019     Sig - Route: Take 17 g by mouth Daily As Needed for Constipation. - Oral    promethazine (PHENERGAN) injection 12.5 mg 12.5 mg Every 6 Hours PRN 7/10/2019     Sig - Route: Inject 0.5 mL into the appropriate muscle as directed by prescriber Every 6 (Six) Hours As Needed for Nausea or Vomiting. - Intramuscular    Linked Group 2:  \"Or\" Linked Group Details        promethazine (PHENERGAN) suppository 12.5 mg 12.5 mg Every 6 Hours PRN 7/10/2019     Sig - Route: Insert 0.5 suppositories into the rectum Every 6 (Six) Hours As Needed for Nausea or Vomiting. - Rectal    Linked Group 2:  \"Or\" Linked Group Details        promethazine (PHENERGAN) tablet 12.5 mg 12.5 mg Every 6 Hours PRN 7/10/2019     Sig - Route: Take 1 tablet by mouth Every 6 (Six) Hours As Needed for Nausea or Vomiting. - Oral    Linked Group 2:  \"Or\" Linked Group Details        sennosides-docusate sodium (SENOKOT-S) 8.6-50 MG tablet 1 tablet 1 tablet Nightly PRN 7/10/2019     Sig - Route: Take 1 tablet by mouth At Night As Needed for Constipation. - Oral    sertraline (ZOLOFT) tablet 100 mg 100 mg Nightly 7/10/2019     Sig - Route: Take 1 tablet by mouth Every Night. - Oral    sodium chloride 0.45 % infusion 100 mL/hr Continuous 7/10/2019     Sig - Route: Infuse 100 mL/hr into a venous catheter Continuous. - Intravenous    sodium chloride 0.9 % flush 3 mL 3 mL Every 12 Hours Scheduled 7/10/2019     Sig - Route: Infuse 3 mL into a venous catheter Every 12 (Twelve) Hours. - Intravenous    sodium chloride 0.9 % flush 3-10 mL 3-10 mL As Needed 7/10/2019     Sig - Route: Infuse 3-10 mL into a venous catheter As Needed for Line Care. - Intravenous    varenicline (CHANTIX) tablet 1 mg 1 mg 2 Times Daily 7/10/2019     Sig - Route: Take 2 tablets by mouth 2 (Two) Times a Day. - Oral    cyclobenzaprine " (FLEXERIL) tablet 10 mg (Discontinued) 10 mg Every 8 Hours Scheduled 7/10/2019 7/11/2019    Sig - Route: Take 1 tablet by mouth Every 8 (Eight) Hours. - Oral               Physician Progress Notes      Luther Navarrete DO at 7/11/2019  7:49 AM          Orthopedic Spine Progress Note    Subjective     Post-Operative Day: 1 post-spine procedure L3-L5 direct lateral and posterior fusion    Systemic or Specific Complaints: Pain Control.  Poorly controlled back pain.  He still complains of bilateral pain in his leg similar to before surgery.  No new pain or numbness in the legs.  He has not been ambulatory    Objective     Vital signs in last 24 hours:  Temp:  [97 °F (36.1 °C)-98.7 °F (37.1 °C)] 98.2 °F (36.8 °C)  Heart Rate:  [58-84] 84  Resp:  [12-20] 16  BP: (106-131)/(61-96) 113/65    General: alert, appears stated age and cooperative   Neurovascular: stable   Wound: Wound clean and dry no evidence of infection.   Range of Motion: limited   DVT Exam: No evidence of DVT seen on physical exam.     Data Review  CBC:  Results from last 7 days   Lab Units 07/11/19  0405 07/05/19  1215   WBC 10*3/mm3  --  8.20   RBC 10*6/mm3  --  4.71   HEMOGLOBIN g/dL 13.0 15.0   HEMATOCRIT % 37.4* 45.1   PLATELETS 10*3/mm3  --  325     Lab Results   Component Value Date    GLUCOSE 111 (H) 07/11/2019    BUN 10 07/11/2019    CREATININE 0.80 07/11/2019    EGFRIFNONA 106 07/11/2019    BCR 12.5 07/11/2019    K 4.0 07/11/2019    CO2 26.0 07/11/2019    CALCIUM 8.8 07/11/2019    ALBUMIN 4.20 07/05/2019    LABIL2 1.9 10/01/2018    AST 14 07/05/2019    ALT 9 07/05/2019         Assessment/Plan     Status post-spine procedure:  Continues current post-op course  His PCA expires today.  His pain is poorly controlled.  I will start him on OxyContin 10 mg extended release every 12 hours with a lot of 4 mg every 6 hours as needed for breakthrough pain.  Consult pain management.  CCP consult for discharge planning.  The patient has an unstable home  environment which places him at increased risk for postoperative complications particularly infection.  He would benefit from subacute rehabilitation    Activity: out of bed and ambulate    Weight Bearing: FWB     LOS: 1 day     Luther Navarrete DO    Date: 7/11/2019      Electronically signed by Luther Navarrete DO at 7/11/2019 12:46 PM          Consult Notes       Nickolas Loera MD at 7/11/2019  2:48 PM          Pain management consult  He is postop day 1 status paced lumbar 1 through post lumbar 3 through 5 fusion.  He is in severe postoperative pain today.  Scheduled Meds:  cholecalciferol 400 Units Oral Daily   clonazePAM 1 mg Oral BID   cyclobenzaprine 5 mg Oral Q8H   gabapentin 400 mg Oral Daily With Breakfast   gabapentin 400 mg Oral Daily With Lunch   gabapentin 800 mg Oral Nightly   OXcarbazepine 150 mg Oral Nightly   oxyCODONE 10 mg Oral Q12H   pantoprazole 40 mg Oral QAM   sertraline 100 mg Oral Nightly   sodium chloride 3 mL Intravenous Q12H   varenicline 1 mg Oral BID     Continuous Infusions:  HYDROmorphone HCl-NaCl     sodium chloride 100 mL/hr Last Rate: 100 mL/hr (07/11/19 0535)     PRN Meds:.•  bisacodyl  •  bisacodyl  •  docusate sodium  •  HYDROmorphone  •  magnesium hydroxide  •  naloxone  •  ondansetron **OR** ondansetron  •  polyethylene glycol  •  promethazine **OR** promethazine **OR** promethazine  •  sennosides-docusate sodium  •  sodium chloride    He is already on Neurontin, Zoloft, Flexeril, Robaxin.  His PCA is being discontinued and he is being started on OxyContin today.    Diagnosis postoperative pain status post lumbar 3 through 5 fusion  Recommendations  Tylenol 1000 mg p.o. every 8 hours  He is already on Zoloft and allergic to Cymbalta, so I am not sure adding another antidepressant would help.  His narcotic regimen seems adequate   At home he was on Dilaudid 4 mg 1-2 p.o. every 6 hours, and this should be restarted as his maintenance regimen.  The OxyContin can still be added on top  of this as a postop pain therapy.  Ketamine would be a useful adjunct in the situation if his pain remains uncontrolled, if possible 25 to 50 mg of ketamine intramuscular every 4 to 6 hours may be helpful.        Electronically signed by Nickolas Loera MD at 7/11/2019  3:07 PM       Kandi Syed, PT   Physical Therapist   Physical Therapy   Plan of Care   Signed   Date of Service:  7/11/2019  4:32 PM   Creation Time:  7/11/2019  4:32 PM            Signed           Problem: Patient Care Overview  Goal: Plan of Care Review    07/11/19 1631   OTHER   Outcome Summary Pt is s/p lumbar fusion and presents with pain in his lower back and LLE and difficulty walking. Pt would benefit from PT to address these imapairments.                      Vicky Mendez RN   Registered Nurse      Plan of Care   Signed   Date of Service:  7/12/2019  4:57 AM   Creation Time:  7/12/2019  4:57 AM            Signed           Problem: Patient Care Overview  Goal: Plan of Care Review  Outcome: Ongoing (interventions implemented as appropriate)    07/12/19 0426   Plan of Care Review   Progress no change   OTHER   Outcome Summary Post lumbar fusion, max temp 101 - Tylenol 1000 mg given, dsgs to back and left side c/d/i, turning well in bed, pain continues to be an issue - PCA d/c'd at start of shift - Oxycontin Q12 added & po Dilaudid Q4 continued. Pt stated that he was told he should be getting 2 times the dose he was previously on & he would just have his wife bring his home meds in to get the coverage he needs. I explained this was not allowed & staff would search his wife if we felt this was happening. He later apologized & said he would not do this. I explained how serious this issue was.                  Johana Urrutia RN   Registered Nurse      Plan of Care   Signed   Date of Service:  7/11/2019  3:28 PM   Creation Time:  7/11/2019  3:28 PM            Signed           Problem: Patient Care Overview  Goal: Plan of Care  Review  Outcome: Ongoing (interventions implemented as appropriate)    07/11/19 1526   Coping/Psychosocial   Plan of Care Reviewed With patient   Plan of Care Review   Progress no change   OTHER   Outcome Summary VSS. Optifoam dressings clean dry and intact. Pt alert and oriented. PCA in use. Pt educated on PCA use. Will continue to monitor.                    Vicky Mendez RN   Registered Nurse      Plan of Care   Signed   Date of Service:  7/11/2019  5:27 AM   Creation Time:  7/11/2019  5:27 AM            Signed           Problem: Patient Care Overview  Goal: Plan of Care Review  Outcome: Ongoing (interventions implemented as appropriate)    07/11/19 0521   Plan of Care Review   Progress no change   OTHER   Outcome Summary VSS, Optifoam dsgs to back & left side c/d/i, left side of tongue swollen & bruised, po Dilaudid x2, pt frustrated with PCA - keeps locking r/t max 1 hr limit reached - attempts to explain proper use unsuccessful, wife at bedside                         Lauryn Mendoza      Case Management   Progress Notes   Signed   Date of Service:  7/11/2019 12:16 PM   Creation Time:  7/11/2019 12:16 PM            Signed                 Discharge Planning Assessment  [facility]     Patient Name: Teo Reynoso             MRN: [id number]  Today's Date: 7/11/2019                     Admit Date: 7/10/2019                     Discharge Plan      Row Name 07/11/19 1212           Plan     Plan  SNF referrals pending; PT eval pending      Patient/Family in Agreement with Plan  yes     Plan Comments  Met with pt and pt's spouse Elin foy. Confirmed facesheet correct. Explained role of CCP. Pt reports he lives with his wife in a boarding house off [facility]. pt reports he has a walker and crutches at home. He has never used HH or SNF. He reports his PCP is correct and uses [facility] on [address] with no issues. Pt reports he doesn't feel he can return home at d/c safely and will need SNF. He  is open to any place that can take his insurance. Referrals to [facility] and [facility] placed in basket. Spoke with [facility] who will see if pt has skilled benefits. FATIMAH Rutledge

## 2019-07-12 NOTE — PROGRESS NOTES
Orthopedic Spine Progress Note    Subjective     Post-Operative Day: 2 post-spine procedure L3-L5 direct lateral and posterior fusion    Systemic or Specific Complaints: Pain poorly Controlled.  Poorly controlled back and leg pain.   No new pain or numbness in the legs.     Objective     Vital signs in last 24 hours:  Temp:  [97.1 °F (36.2 °C)-102 °F (38.9 °C)] 102 °F (38.9 °C)  Heart Rate:  [81-99] 88  Resp:  [16] 16  BP: (108-150)/(58-90) 150/90    General: alert, appears stated age and cooperative   Neurovascular: stable   Wound: Wound clean and dry no evidence of infection.   Range of Motion: limited   DVT Exam: No evidence of DVT seen on physical exam.     Data Review  CBC:  Results from last 7 days   Lab Units 07/11/19  0405   HEMOGLOBIN g/dL 13.0   HEMATOCRIT % 37.4*     Lab Results   Component Value Date    GLUCOSE 111 (H) 07/11/2019    BUN 10 07/11/2019    CREATININE 0.80 07/11/2019    EGFRIFNONA 106 07/11/2019    BCR 12.5 07/11/2019    K 4.0 07/11/2019    CO2 26.0 07/11/2019    CALCIUM 8.8 07/11/2019    ALBUMIN 4.20 07/05/2019    LABIL2 1.9 10/01/2018    AST 14 07/05/2019    ALT 9 07/05/2019       Assessment/Plan     Status post-spine procedure:  Continues current post-op course  Increase dilaudid to 8mg.  Fever. Encouraged. Incentive spirometer  Up with PT    Activity: out of bed and ambulate    Weight Bearing: FWB     LOS: 2 days     Luther Navarrete DO    Date: 7/12/2019

## 2019-07-13 ENCOUNTER — APPOINTMENT (OUTPATIENT)
Dept: GENERAL RADIOLOGY | Facility: HOSPITAL | Age: 44
End: 2019-07-13

## 2019-07-13 LAB
BASOPHILS # BLD AUTO: 0.06 10*3/MM3 (ref 0–0.2)
BASOPHILS NFR BLD AUTO: 0.4 % (ref 0–1.5)
DEPRECATED RDW RBC AUTO: 44.7 FL (ref 37–54)
EOSINOPHIL # BLD AUTO: 0.32 10*3/MM3 (ref 0–0.4)
EOSINOPHIL NFR BLD AUTO: 2.2 % (ref 0.3–6.2)
ERYTHROCYTE [DISTWIDTH] IN BLOOD BY AUTOMATED COUNT: 12.9 % (ref 12.3–15.4)
HCT VFR BLD AUTO: 39.6 % (ref 37.5–51)
HGB BLD-MCNC: 13.3 G/DL (ref 13–17.7)
IMM GRANULOCYTES # BLD AUTO: 0.05 10*3/MM3 (ref 0–0.05)
IMM GRANULOCYTES NFR BLD AUTO: 0.3 % (ref 0–0.5)
LYMPHOCYTES # BLD AUTO: 3.07 10*3/MM3 (ref 0.7–3.1)
LYMPHOCYTES NFR BLD AUTO: 21 % (ref 19.6–45.3)
MCH RBC QN AUTO: 31.9 PG (ref 26.6–33)
MCHC RBC AUTO-ENTMCNC: 33.6 G/DL (ref 31.5–35.7)
MCV RBC AUTO: 95 FL (ref 79–97)
MONOCYTES # BLD AUTO: 1.56 10*3/MM3 (ref 0.1–0.9)
MONOCYTES NFR BLD AUTO: 10.7 % (ref 5–12)
NEUTROPHILS # BLD AUTO: 9.54 10*3/MM3 (ref 1.7–7)
NEUTROPHILS NFR BLD AUTO: 65.4 % (ref 42.7–76)
NRBC BLD AUTO-RTO: 0.1 /100 WBC (ref 0–0.2)
PLATELET # BLD AUTO: 317 10*3/MM3 (ref 140–450)
PMV BLD AUTO: 10.8 FL (ref 6–12)
RBC # BLD AUTO: 4.17 10*6/MM3 (ref 4.14–5.8)
WBC NRBC COR # BLD: 14.6 10*3/MM3 (ref 3.4–10.8)

## 2019-07-13 PROCEDURE — 97530 THERAPEUTIC ACTIVITIES: CPT

## 2019-07-13 PROCEDURE — 85025 COMPLETE CBC W/AUTO DIFF WBC: CPT | Performed by: ORTHOPAEDIC SURGERY

## 2019-07-13 PROCEDURE — 97116 GAIT TRAINING THERAPY: CPT

## 2019-07-13 PROCEDURE — 25010000002 KETOROLAC TROMETHAMINE PER 15 MG: Performed by: ORTHOPAEDIC SURGERY

## 2019-07-13 PROCEDURE — 25010000002 HYDROMORPHONE 1 MG/ML SOLUTION: Performed by: ORTHOPAEDIC SURGERY

## 2019-07-13 PROCEDURE — 71046 X-RAY EXAM CHEST 2 VIEWS: CPT

## 2019-07-13 RX ORDER — KETOROLAC TROMETHAMINE 30 MG/ML
30 INJECTION, SOLUTION INTRAMUSCULAR; INTRAVENOUS EVERY 6 HOURS PRN
Status: COMPLETED | OUTPATIENT
Start: 2019-07-13 | End: 2019-07-14

## 2019-07-13 RX ADMIN — SERTRALINE 100 MG: 100 TABLET, FILM COATED ORAL at 21:06

## 2019-07-13 RX ADMIN — HYDROMORPHONE HYDROCHLORIDE 1 MG: 1 INJECTION, SOLUTION INTRAMUSCULAR; INTRAVENOUS; SUBCUTANEOUS at 22:29

## 2019-07-13 RX ADMIN — MAGNESIUM HYDROXIDE 10 ML: 2400 SUSPENSION ORAL at 17:47

## 2019-07-13 RX ADMIN — HYDROMORPHONE HYDROCHLORIDE 8 MG: 4 TABLET ORAL at 17:48

## 2019-07-13 RX ADMIN — CLONAZEPAM 1 MG: 1 TABLET ORAL at 21:06

## 2019-07-13 RX ADMIN — KETOROLAC TROMETHAMINE 30 MG: 30 INJECTION, SOLUTION INTRAMUSCULAR at 21:53

## 2019-07-13 RX ADMIN — HYDROMORPHONE HYDROCHLORIDE 8 MG: 4 TABLET ORAL at 01:19

## 2019-07-13 RX ADMIN — OXYCODONE HYDROCHLORIDE 10 MG: 10 TABLET, FILM COATED, EXTENDED RELEASE ORAL at 08:38

## 2019-07-13 RX ADMIN — GABAPENTIN 800 MG: 400 CAPSULE ORAL at 21:06

## 2019-07-13 RX ADMIN — CHOLECALCIFEROL TAB 10 MCG (400 UNIT) 400 UNITS: 10 TAB at 08:38

## 2019-07-13 RX ADMIN — HYDROMORPHONE HYDROCHLORIDE 1 MG: 1 INJECTION, SOLUTION INTRAMUSCULAR; INTRAVENOUS; SUBCUTANEOUS at 12:19

## 2019-07-13 RX ADMIN — PANTOPRAZOLE SODIUM 40 MG: 40 TABLET, DELAYED RELEASE ORAL at 05:25

## 2019-07-13 RX ADMIN — HYDROMORPHONE HYDROCHLORIDE 8 MG: 4 TABLET ORAL at 05:25

## 2019-07-13 RX ADMIN — CYCLOBENZAPRINE 5 MG: 10 TABLET, FILM COATED ORAL at 21:06

## 2019-07-13 RX ADMIN — KETOROLAC TROMETHAMINE 30 MG: 30 INJECTION, SOLUTION INTRAMUSCULAR at 14:46

## 2019-07-13 RX ADMIN — OXYCODONE HYDROCHLORIDE 10 MG: 10 TABLET, FILM COATED, EXTENDED RELEASE ORAL at 21:06

## 2019-07-13 RX ADMIN — GABAPENTIN 400 MG: 400 CAPSULE ORAL at 12:13

## 2019-07-13 RX ADMIN — CLONAZEPAM 1 MG: 1 TABLET ORAL at 08:38

## 2019-07-13 RX ADMIN — SODIUM CHLORIDE, PRESERVATIVE FREE 3 ML: 5 INJECTION INTRAVENOUS at 08:39

## 2019-07-13 RX ADMIN — VARENICLINE TARTRATE 1 MG: 0.5 TABLET, FILM COATED ORAL at 21:06

## 2019-07-13 RX ADMIN — OXCARBAZEPINE 150 MG: 150 TABLET, FILM COATED ORAL at 21:05

## 2019-07-13 RX ADMIN — SODIUM CHLORIDE, PRESERVATIVE FREE 3 ML: 5 INJECTION INTRAVENOUS at 21:06

## 2019-07-13 RX ADMIN — VARENICLINE TARTRATE 1 MG: 0.5 TABLET, FILM COATED ORAL at 08:38

## 2019-07-13 RX ADMIN — CYCLOBENZAPRINE 5 MG: 10 TABLET, FILM COATED ORAL at 05:25

## 2019-07-13 RX ADMIN — GABAPENTIN 400 MG: 400 CAPSULE ORAL at 08:38

## 2019-07-13 RX ADMIN — HYDROMORPHONE HYDROCHLORIDE 8 MG: 4 TABLET ORAL at 09:24

## 2019-07-13 RX ADMIN — CYCLOBENZAPRINE 5 MG: 10 TABLET, FILM COATED ORAL at 14:46

## 2019-07-13 RX ADMIN — HYDROMORPHONE HYDROCHLORIDE 8 MG: 4 TABLET ORAL at 13:38

## 2019-07-13 RX ADMIN — HYDROMORPHONE HYDROCHLORIDE 1 MG: 1 INJECTION, SOLUTION INTRAMUSCULAR; INTRAVENOUS; SUBCUTANEOUS at 16:44

## 2019-07-13 NOTE — PLAN OF CARE
Problem: Patient Care Overview  Goal: Plan of Care Review  Outcome: Ongoing (interventions implemented as appropriate)   07/13/19 0252   Coping/Psychosocial   Plan of Care Reviewed With patient   Plan of Care Review   Progress improving   OTHER   Outcome Summary discussed rewards of quitting Smoking. pt on med to help quit smoking. pain well controlled.       Problem: Fall Risk (Adult)  Goal: Absence of Fall  Outcome: Outcome(s) achieved Date Met: 07/13/19 07/13/19 0252   Fall Risk (Adult)   Absence of Fall making progress toward outcome

## 2019-07-13 NOTE — PLAN OF CARE
Problem: Patient Care Overview  Goal: Plan of Care Review  Outcome: Ongoing (interventions implemented as appropriate)   07/13/19 9574   Coping/Psychosocial   Plan of Care Reviewed With patient   Plan of Care Review   Progress improving   OTHER   Outcome Summary Pt participating in PT session following pain meidcation administration. Pt able to ambulate 80' with RW this date and perform ther ex. Pt will require continued skilled PT to improve functional mobility.

## 2019-07-13 NOTE — THERAPY TREATMENT NOTE
Acute Care - Physical Therapy Progress Note  [facility]     Patient Name: Teo Reynoso  : 1975  MRN: [id number]  Today's Date: 2019             Admit Date: 7/10/2019    Visit Dx:  No diagnosis found.  Patient Active Problem List   Diagnosis   • Lumbar spinal stenosis       Therapy Treatment    Rehabilitation Treatment Summary     Row Name 19 1450             Treatment Time/Intention    Discipline  physical therapist  -MR      Document Type  therapy note (daily note)  -MR      Subjective Information  complains of;pain  -MR      Mode of Treatment  physical therapy;individual therapy  -MR      Patient/Family Observations  Pt resting in bed upon PT arrival.  -MR      Therapy Frequency (PT Clinical Impression)  daily  -MR      Patient Effort  good  -MR      Comment  Pt having just received pain medication and agreeable to mobility.  -MR      Existing Precautions/Restrictions  fall;spinal fusion  -MR      Recorded by [MR] Dominique Castillo, PT 19 1524      Row Name 19 1450             Cognitive Assessment/Intervention- PT/OT    Orientation Status (Cognition)  oriented x 4  -MR      Follows Commands (Cognition)  WNL  -MR      Recorded by [MR] Dominique Castillo, PT 19 1528      Row Name 19 1450             Bed Mobility Assessment/Treatment    Bed Mobility Assessment/Treatment  rolling left  -MR      Rolling Left [place] (Bed Mobility)  contact guard  -MR      Sidelying-Sit [place] (Bed Mobility)  minimum assist (75% patient effort);verbal cues  -MR      Recorded by [MR] JonathanDominique callejas, PT 19 1524      Row Name 19 1450             Transfer Assessment/Treatment    Transfer Assessment/Treatment  sit-stand transfer;stand-sit transfer  -MR      Comment (Transfers)  Pt performing 5 x sit <> stand training requiring increased time for tasks and increased cueing for equal use of BLE.  -MR      Recorded by [MR] Dominique Castillo, PT 19 1523      Row Name  07/13/19 1450             Sit-Stand Transfer    Sit-Stand [place] (Transfers)  contact guard;verbal cues  -MR      Assistive Device (Sit-Stand Transfers)  walker, front-wheeled  -MR      Recorded by [MR] Dominique Castillo, PT 07/13/19 1524      Row Name 07/13/19 1450             Stand-Sit Transfer    Stand-Sit [place] (Transfers)  contact guard;verbal cues  -MR      Assistive Device (Stand-Sit Transfers)  walker, front-wheeled  -MR      Recorded by [MR] Dominique Castillo, PT 07/13/19 1524      Row Name 07/13/19 1450             Gait/Stairs Assessment/Training    Gait/Stairs Assessment/Training  gait/ambulation assistive device  -MR      [place] Level (Gait)  contact guard;verbal cues  -MR      Assistive Device (Gait)  walker, front-wheeled  -MR      Distance in Feet (Gait)  85  -MR      Pattern (Gait)  step-through  -MR      Deviations/Abnormal Patterns (Gait)  antalgic;gait speed decreased;stride length decreased  -MR      Bilateral Gait Deviations  forward flexed posture  -MR      Left Sided Gait Deviations  weight shift ability decreased dragging with fatigue  -MR      Comment (Gait/Stairs)  Pt cued for emphasized heel strike with fatigue to prevent impaired LLE clearance.  -MR      Recorded by [MR] Dominique Castillo, PT 07/13/19 1524      Row Name 07/13/19 1450             Therapeutic Exercise    Comment (Therapeutic Exercise)  Alternating heel slides and hip ab/adduction x 10 reps.  -MR      Recorded by [MR] Dominique Castillo, PT 07/13/19 1524      Row Name 07/13/19 1450             Positioning and Restraints    Pre-Treatment Position  in bed  -MR      Post Treatment Position  bed  -MR      In Bed  supine;call light within reach  -MR      Recorded by [MR] Dominique Castillo, PT 07/13/19 1524      Row Name                Wound 07/10/19 1458 Left back incision    Wound - Properties Group Date first assessed: 07/10/19 [AS] Time first assessed: 1458 [AS] Side: Left [AS] Location: back [AS] Type:  incision [AS] Recorded by:  [AS] Yuliana Frye RN 07/10/19 1458    Row Name                Wound 07/10/19 1458 back incision    Wound - Properties Group Date first assessed: 07/10/19 [AS] Time first assessed: 1458 [AS] Location: back [AS] Type: incision [AS] Recorded by:  [AS] Yuliana Frye RN 07/10/19 1458    Row Name 07/13/19 1450             Plan of Care Review    Plan of Care Reviewed With  patient  -MR      Recorded by [MR] Dominique Castillo, PT 07/13/19 1524      Row Name 07/13/19 1450             Outcome Summary/Treatment Plan (PT)    Daily Summary of Progress (PT)  progress toward functional goals is good  -MR      Anticipated Discharge Disposition (PT)  skilled nursing facility;home with home health  -MR      Recorded by [MR] Jonathan Dominique, PT 07/13/19 1524        User Key  (r) = Recorded By, (t) = Taken By, (c) = Cosigned By    Initials Name Effective Dates Discipline    AS Yuliana Frye RN 12/13/16 -  Nurse    MR De JesusJonathanDominique callejas, PT 01/03/19 -  PT          Wound 07/10/19 1458 Left back incision (Active)   Dressing Appearance dry;intact;no drainage 7/13/2019  8:40 AM   Closure JESSICA 7/13/2019  8:40 AM   Base dressing in place, unable to visualize 7/13/2019  8:40 AM   Drainage Amount none 7/13/2019  8:40 AM   Dressing Care, Wound foam 7/13/2019  8:40 AM       Wound 07/10/19 1458 back incision (Active)   Dressing Appearance dry;intact 7/13/2019  8:40 AM   Closure JESSICA 7/13/2019  8:40 AM   Base dressing in place, unable to visualize 7/13/2019  8:40 AM   Drainage Amount none 7/13/2019  8:40 AM   Dressing Care, Wound low-adherent 7/13/2019  8:40 AM           Physical Therapy Education     Title: PT OT SLP Therapies (Done)     Topic: Physical Therapy (Done)     Point: Mobility training (Done)     Learning Progress Summary           Patient Acceptance, E, VU,NR by MA at 7/12/2019 11:44 AM    Acceptance, E,TB, VU by PRISCILLA at 7/11/2019  4:31 PM                   Point: Home exercise program (Done)     Learning Progress  Summary           Patient Acceptance, E, VU,NR by MA at 7/12/2019 11:44 AM    Acceptance, E,TB, VU by  at 7/11/2019  4:31 PM                   Point: Body mechanics (Done)     Learning Progress Summary           Patient Acceptance, E, VU,NR by MA at 7/12/2019 11:44 AM    Acceptance, E,TB, VU by  at 7/11/2019  4:31 PM                   Point: Precautions (Done)     Learning Progress Summary           Patient Acceptance, E, VU,NR by MA at 7/12/2019 11:44 AM    Acceptance, E,TB, VU by  at 7/11/2019  4:31 PM                               User Key     Initials Effective Dates Name Provider Type Discipline     02/05/19 -  Kandi Syed, PT Physical Therapist PT    MA 10/19/18 -  Jennie Salguero, PT Physical Therapist PT                PT Recommendation and Plan  Anticipated Discharge Disposition (PT): skilled nursing facility, home with home health  Therapy Frequency (PT Clinical Impression): daily  Outcome Summary/Treatment Plan (PT)  Daily Summary of Progress (PT): progress toward functional goals is good  Anticipated Discharge Disposition (PT): skilled nursing facility, home with home health  Plan of Care Reviewed With: patient  Progress: improving  Outcome Summary: Pt participating in PT session following pain meidcation administration.  Pt able to ambulate 80' with RW this date and perform ther ex.  Pt will require continued skilled PT to improve functional mobility.  Outcome Measures     Row Name 07/13/19 1525 07/12/19 1100 07/11/19 1600       How much help from another person do you currently need...    Turning from your back to your side while in flat bed without using bedrails?  4  -MR  4  -MA  4  -KH    Moving from lying on back to sitting on the side of a flat bed without bedrails?  3  -MR  3  -MA  3  -KH    Moving to and from a bed to a chair (including a wheelchair)?  3  -MR  3  -MA  3  -KH    Standing up from a chair using your arms (e.g., wheelchair, bedside chair)?  3  -MR  3  -MA  3  -KH     Climbing 3-5 steps with a railing?  2  -MR  2  -MA  2  -KH    To walk in hospital room?  3  -MR  3  -MA  3  -KH    AM-PAC 6 Clicks Score (PT)  18  -MR  18  -MA  18  -KH       Functional Assessment    Outcome Measure Options  AM-PAC 6 Clicks Basic Mobility (PT)  -MR  AM-PAC 6 Clicks Basic Mobility (PT)  -MA  AM-PAC 6 Clicks Basic Mobility (PT)  -KH      User Key  (r) = Recorded By, (t) = Taken By, (c) = Cosigned By    Initials Name Provider Type    Kandi Tam, PT Physical Therapist    Jennie Pulido, PT Physical Therapist    Dominique Saenz, PT Physical Therapist         Time Calculation:   PT Charges     Row Name 07/13/19 1525             Time Calculation    Start Time  1450  -MR      Stop Time  1514  -MR      Time Calculation (min)  24 min  -MR      PT Received On  07/13/19  -MR      PT - Next Appointment  07/14/19  -MR         Time Calculation- PT    Total Timed Code Minutes- PT  24 minute(s)  -MR        User Key  (r) = Recorded By, (t) = Taken By, (c) = Cosigned By    Initials Name Provider Type     Dominique Castillo, PT Physical Therapist        Therapy Charges for Today     Code Description Service Date Service Provider Modifiers Qty    82324774291 HC PT THERAPEUTIC ACT EA 15 MIN 7/13/2019 Dominique Castillo, PT GP 1    01088686337 HC GAIT TRAINING EA 15 MIN 7/13/2019 Dominique Castillo, PT GP 1          PT G-Codes  Outcome Measure Options: AM-PAC 6 Clicks Basic Mobility (PT)  AM-PAC 6 Clicks Score (PT): 18    Dominique Castillo, PT  7/13/2019

## 2019-07-13 NOTE — PLAN OF CARE
Problem: Patient Care Overview  Goal: Plan of Care Review  Outcome: Ongoing (interventions implemented as appropriate)   07/13/19 1616   Coping/Psychosocial   Plan of Care Reviewed With patient   Plan of Care Review   Progress improving   OTHER   Outcome Summary Pt AOx4, VSS, pain controlled with PRN meds, pt reports improved pain control with new IV dilaudid. Up x1 assist with walker and back brace. Denies numbness and tingling. Drsg to back C/D/I. Will continue to monitor.        Problem: Pain, Chronic (Adult)  Goal: Acceptable Pain/Comfort Level and Functional Ability  Outcome: Ongoing (interventions implemented as appropriate)

## 2019-07-13 NOTE — PROGRESS NOTES
Orthopedic Spine Progress Note    Subjective     Post-Operative Day: 3 post-spine procedure L3-5 MINIMALLY INVASIVE TRANSFORMINAL LUMBAR INTERBODY FUSION  Systemic or Specific Complaints:  patient complains of severe pain and fever    Objective     Vital signs in last 24 hours:  Temp:  [98.1 °F (36.7 °C)-99.4 °F (37.4 °C)] 98.7 °F (37.1 °C)  Heart Rate:  [70-89] 75  Resp:  [16-22] 20  BP: (119-128)/(77-82) 119/78    General: alert, appears stated age and cooperative   Neurovascular: stable   Wound: Wound clean and dry no evidence of infection.   Range of Motion: limited   DVT Exam: No evidence of DVT seen on physical exam.     Data Review  CBC:  Results from last 7 days   Lab Units 07/11/19  0405   HEMOGLOBIN g/dL 13.0   HEMATOCRIT % 37.4*       Assessment/Plan     Status post-spine procedure:     Pain Relief: no relief    Continues current post-op course  Dilaudid IV for severe breakthrough pain.  I encouraged him to ambulate with physical therapy.  I will get a CBC to evaluate his white count.  He may need blood cultures if he continues to run a fever.  His wounds look clean and dry.  I will also do a UA and chest x-ray.    Activity: out of bed and ambulate    Weight Bearing: FWB     LOS: 3 days     Luther Navarrete DO    Date: 7/13/2019

## 2019-07-14 LAB
BILIRUB UR QL STRIP: NEGATIVE
CLARITY UR: CLEAR
COLOR UR: ABNORMAL
GLUCOSE UR STRIP-MCNC: NEGATIVE MG/DL
HGB UR QL STRIP.AUTO: NEGATIVE
KETONES UR QL STRIP: ABNORMAL
LEUKOCYTE ESTERASE UR QL STRIP.AUTO: NEGATIVE
NITRITE UR QL STRIP: NEGATIVE
PH UR STRIP.AUTO: 6.5 [PH] (ref 5–8)
PROT UR QL STRIP: NEGATIVE
SP GR UR STRIP: 1.02 (ref 1–1.03)
UROBILINOGEN UR QL STRIP: ABNORMAL

## 2019-07-14 PROCEDURE — 25010000002 HYDROMORPHONE 1 MG/ML SOLUTION: Performed by: ORTHOPAEDIC SURGERY

## 2019-07-14 PROCEDURE — 97116 GAIT TRAINING THERAPY: CPT

## 2019-07-14 PROCEDURE — 81003 URINALYSIS AUTO W/O SCOPE: CPT | Performed by: ORTHOPAEDIC SURGERY

## 2019-07-14 PROCEDURE — 25010000002 KETOROLAC TROMETHAMINE PER 15 MG: Performed by: ORTHOPAEDIC SURGERY

## 2019-07-14 RX ADMIN — HYDROMORPHONE HYDROCHLORIDE 1 MG: 1 INJECTION, SOLUTION INTRAMUSCULAR; INTRAVENOUS; SUBCUTANEOUS at 10:53

## 2019-07-14 RX ADMIN — GABAPENTIN 800 MG: 400 CAPSULE ORAL at 21:47

## 2019-07-14 RX ADMIN — GABAPENTIN 400 MG: 400 CAPSULE ORAL at 08:01

## 2019-07-14 RX ADMIN — OXYCODONE HYDROCHLORIDE 10 MG: 10 TABLET, FILM COATED, EXTENDED RELEASE ORAL at 21:48

## 2019-07-14 RX ADMIN — CYCLOBENZAPRINE 5 MG: 10 TABLET, FILM COATED ORAL at 06:19

## 2019-07-14 RX ADMIN — SODIUM CHLORIDE, PRESERVATIVE FREE 3 ML: 5 INJECTION INTRAVENOUS at 22:28

## 2019-07-14 RX ADMIN — HYDROMORPHONE HYDROCHLORIDE 1 MG: 1 INJECTION, SOLUTION INTRAMUSCULAR; INTRAVENOUS; SUBCUTANEOUS at 23:51

## 2019-07-14 RX ADMIN — CLONAZEPAM 1 MG: 1 TABLET ORAL at 08:02

## 2019-07-14 RX ADMIN — HYDROMORPHONE HYDROCHLORIDE 8 MG: 4 TABLET ORAL at 18:06

## 2019-07-14 RX ADMIN — SODIUM CHLORIDE, PRESERVATIVE FREE 3 ML: 5 INJECTION INTRAVENOUS at 08:02

## 2019-07-14 RX ADMIN — KETOROLAC TROMETHAMINE 30 MG: 30 INJECTION, SOLUTION INTRAMUSCULAR at 12:07

## 2019-07-14 RX ADMIN — HYDROMORPHONE HYDROCHLORIDE 1 MG: 1 INJECTION, SOLUTION INTRAMUSCULAR; INTRAVENOUS; SUBCUTANEOUS at 15:37

## 2019-07-14 RX ADMIN — HYDROMORPHONE HYDROCHLORIDE 1 MG: 1 INJECTION, SOLUTION INTRAMUSCULAR; INTRAVENOUS; SUBCUTANEOUS at 02:26

## 2019-07-14 RX ADMIN — SERTRALINE 100 MG: 100 TABLET, FILM COATED ORAL at 21:48

## 2019-07-14 RX ADMIN — VARENICLINE TARTRATE 1 MG: 0.5 TABLET, FILM COATED ORAL at 21:47

## 2019-07-14 RX ADMIN — HYDROMORPHONE HYDROCHLORIDE 8 MG: 4 TABLET ORAL at 13:56

## 2019-07-14 RX ADMIN — CHOLECALCIFEROL TAB 10 MCG (400 UNIT) 400 UNITS: 10 TAB at 08:01

## 2019-07-14 RX ADMIN — CLONAZEPAM 1 MG: 1 TABLET ORAL at 21:47

## 2019-07-14 RX ADMIN — HYDROMORPHONE HYDROCHLORIDE 1 MG: 1 INJECTION, SOLUTION INTRAMUSCULAR; INTRAVENOUS; SUBCUTANEOUS at 19:43

## 2019-07-14 RX ADMIN — VARENICLINE TARTRATE 1 MG: 0.5 TABLET, FILM COATED ORAL at 08:01

## 2019-07-14 RX ADMIN — CYCLOBENZAPRINE 5 MG: 10 TABLET, FILM COATED ORAL at 15:37

## 2019-07-14 RX ADMIN — HYDROMORPHONE HYDROCHLORIDE 8 MG: 4 TABLET ORAL at 09:55

## 2019-07-14 RX ADMIN — HYDROMORPHONE HYDROCHLORIDE 8 MG: 4 TABLET ORAL at 22:28

## 2019-07-14 RX ADMIN — PANTOPRAZOLE SODIUM 40 MG: 40 TABLET, DELAYED RELEASE ORAL at 06:19

## 2019-07-14 RX ADMIN — POLYETHYLENE GLYCOL 3350 17 G: 17 POWDER, FOR SOLUTION ORAL at 08:02

## 2019-07-14 RX ADMIN — OXCARBAZEPINE 150 MG: 150 TABLET, FILM COATED ORAL at 21:47

## 2019-07-14 RX ADMIN — HYDROMORPHONE HYDROCHLORIDE 1 MG: 1 INJECTION, SOLUTION INTRAMUSCULAR; INTRAVENOUS; SUBCUTANEOUS at 06:19

## 2019-07-14 RX ADMIN — CYCLOBENZAPRINE 5 MG: 10 TABLET, FILM COATED ORAL at 21:47

## 2019-07-14 RX ADMIN — GABAPENTIN 400 MG: 400 CAPSULE ORAL at 12:07

## 2019-07-14 RX ADMIN — OXYCODONE HYDROCHLORIDE 10 MG: 10 TABLET, FILM COATED, EXTENDED RELEASE ORAL at 08:01

## 2019-07-14 NOTE — PROGRESS NOTES
"  Orthopaedic Surgery   Daily Progress Note  Dr. Dez Woo   [phone]  DEMOGRAPHICS:   · Teo Reynoso   · Age:44 y.o.   · MRN:[id number]  · Admitted: 7/10/2019    PROCEDURE: 4 Days Post-Op s/p Procedure(s):  STAGE 1 L3-5 DIRECT LATERAL INTERBODY FUSION WITH BONE MORPHAGENIC PROTEIN STAGE 2 POSTERIOR SPINAL FUSION L3-5  STAGE 2 POSTERIOR SPINAL FUSION     /64 (BP Location: Left arm, Patient Position: Lying)   Pulse 68   Temp 98 °F (36.7 °C) (Oral)   Resp 22   Ht 172.7 cm (68\")   Wt 75 kg (165 lb 4 oz)   SpO2 96%   BMI 25.13 kg/m²     Lab Results (last 24 hours)     Procedure Component Value Units Date/Time    CBC & Differential [[id number]] Collected:  07/13/19 1304    Specimen:  Blood Updated:  07/13/19 1334    Narrative:       The following orders were created for panel order CBC & Differential.  Procedure                               Abnormality         Status                     ---------                               -----------         ------                     CBC Auto Differential[[id number]]        Abnormal            Final result                 Please view results for these tests on the individual orders.    CBC Auto Differential [[id number]]  (Abnormal) Collected:  07/13/19 1304    Specimen:  Blood from Hand, Right Updated:  07/13/19 1334     WBC 14.60 10*3/mm3      RBC 4.17 10*6/mm3      Hemoglobin 13.3 g/dL      Hematocrit 39.6 %      MCV 95.0 fL      MCH 31.9 pg      MCHC 33.6 g/dL      RDW 12.9 %      RDW-SD 44.7 fl      MPV 10.8 fL      Platelets 317 10*3/mm3      Neutrophil % 65.4 %      Lymphocyte % 21.0 %      Monocyte % 10.7 %      Eosinophil % 2.2 %      Basophil % 0.4 %      Immature Grans % 0.3 %      Neutrophils, Absolute 9.54 10*3/mm3      Lymphocytes, Absolute 3.07 10*3/mm3      Monocytes, Absolute 1.56 10*3/mm3      Eosinophils, Absolute 0.32 10*3/mm3      Basophils, Absolute 0.06 10*3/mm3      Immature Grans, Absolute 0.05 10*3/mm3      nRBC 0.1 /100 WBC     "       Imaging Results (last 24 hours)     Procedure Component Value Units Date/Time    XR Chest PA & Lateral [[id number]] Collected:  07/13/19 1516     Updated:  07/13/19 1614    Narrative:       TWO-VIEW CHEST     HISTORY: Cough and fever.     FINDINGS: The lungs are moderately well-expanded with what appears to be  some predominately linear atelectasis at the right base medially when  compared to the study of 07/05/2019. I cannot completely exclude minimal  developing infiltrate. There is also a suspicion of a tiny pleural  effusion as seen in the lateral view. The heart size is normal.     This report was finalized on 7/13/2019 4:11 PM by Dr. Jimmy Obrien M.D.             Patient Care Team:  Jt Marino MD as PCP - General (Internal Medicine)    SUBJECTIVE  Afebrile now  More comfortable    PHYSICAL EXAM  Mobilizing better     ASSESSMENT: Patient is a 44 y.o. male who is 4 Days Post-Op s/p Procedure(s):  STAGE 1 L3-5 DIRECT LATERAL INTERBODY FUSION WITH BONE MORPHAGENIC PROTEIN STAGE 2 POSTERIOR SPINAL FUSION L3-5  STAGE 2 POSTERIOR SPINAL FUSION     PLAN / DISPOSITION:  Continue to mobilize.  Continue to monitor temperature curve.    Dez Woo Sr, MD  Orthopaedic Surgery  [facility]  [phone]

## 2019-07-14 NOTE — THERAPY TREATMENT NOTE
Acute Care - Physical Therapy Progress Note  [facility]     Patient Name: Teo Reynoso  : 1975  MRN: [id number]  Today's Date: 2019             Admit Date: 7/10/2019    Visit Dx:  No diagnosis found.  Patient Active Problem List   Diagnosis   • Lumbar spinal stenosis       Therapy Treatment    Rehabilitation Treatment Summary     Row Name 19 1533             Treatment Time/Intention    Discipline  physical therapist  -MR      Document Type  therapy note (daily note)  -MR      Subjective Information  complains of;pain  -MR      Mode of Treatment  physical therapy;individual therapy  -MR      Patient/Family Observations  Pt resting in bed upon PT arrival.  -MR      Therapy Frequency (PT Clinical Impression)  daily  -MR      Patient Effort  good  -MR      Comment  RN administering pain meds for therapy session.  -MR      Existing Precautions/Restrictions  fall;spinal fusion  -MR      Recorded by [MR] Dominique Castillo, PT 19 1630      Row Name 19 1533             Cognitive Assessment/Intervention- PT/OT    Orientation Status (Cognition)  oriented x 4  -MR      Recorded by [MR] Dominique Castillo, PT 19 1630      Row Name 19 1533             Bed Mobility Assessment/Treatment    Rolling Right [place] (Bed Mobility)  contact guard  -MR      Sidelying-Sit [place] (Bed Mobility)  contact guard;verbal cues  -MR      Assistive Device (Bed Mobility)  bed rails  -MR      Comment (Bed Mobility)  Improvement with bed mobility this date.  -MR      Recorded by [MR] Dominique Castillo, PT 19 1630      Row Name 19 1533             Transfer Assessment/Treatment    Transfer Assessment/Treatment  sit-stand transfer;stand-sit transfer  -MR      Recorded by [MR] Dominique Castillo, PT 19 1630      Row Name 19 1533             Sit-Stand Transfer    Sit-Stand [place] (Transfers)  contact guard;supervision  -MR      Assistive Device (Sit-Stand Transfers)   walker, front-wheeled  -MR      Recorded by [MR] JonathanDominique, PT 07/14/19 1630      Row Name 07/14/19 1533             Stand-Sit Transfer    Stand-Sit [place] (Transfers)  supervision;verbal cues  -MR      Assistive Device (Stand-Sit Transfers)  walker, front-wheeled  -MR      Recorded by [MR] JonathanDominique, PT 07/14/19 1630      Row Name 07/14/19 1533             Gait/Stairs Assessment/Training    Gait/Stairs Assessment/Training  gait/ambulation assistive device  -MR      [place] Level (Gait)  contact guard;verbal cues  -MR      Assistive Device (Gait)  walker, front-wheeled  -MR      Distance in Feet (Gait)  120 + 75  -MR      Pattern (Gait)  step-through  -MR      Deviations/Abnormal Patterns (Gait)  antalgic;gait speed decreased;stride length decreased  -MR      Bilateral Gait Deviations  forward flexed posture  -MR      Comment (Gait/Stairs)  Pt cued for proper posture and normalized gait pattern.  -MR      Recorded by [MR] JonathanDominique, PT 07/14/19 1630      Row Name 07/14/19 1533             Positioning and Restraints    Pre-Treatment Position  in bed  -MR      Post Treatment Position  chair  -MR      In Chair  sitting;call light within reach;encouraged to call for assist RN aware pt without chair alarm  -MR      Recorded by [MR] JonathanDominique, PT 07/14/19 1630      Row Name                Wound 07/10/19 1458 Left back incision    Wound - Properties Group Date first assessed: 07/10/19 [AS] Time first assessed: 1458 [AS] Side: Left [AS] Location: back [AS] Type: incision [AS] Recorded by:  [AS] Yuliana Frye RN 07/10/19 1458    Row Name                Wound 07/10/19 1458 back incision    Wound - Properties Group Date first assessed: 07/10/19 [AS] Time first assessed: 1458 [AS] Location: back [AS] Type: incision [AS] Recorded by:  [AS] Yuliana Frye RN 07/10/19 1458    Row Name 07/14/19 1533             Plan of Care Review    Plan of Care Reviewed With  patient  -MR      Recorded by  [MR] Dominique Castillo, PT 07/14/19 1630      Row Name 07/14/19 1533             Outcome Summary/Treatment Plan (PT)    Daily Summary of Progress (PT)  progress toward functional goals is good  -MR      Anticipated Discharge Disposition (PT)  skilled nursing facility;home with home health  -MR      Recorded by [] Dominique Castillo, PT 07/14/19 1630        User Key  (r) = Recorded By, (t) = Taken By, (c) = Cosigned By    Initials Name Effective Dates Discipline    AS Yuliana Frye RN 12/13/16 -  Nurse    Dominique Saenz, PT 01/03/19 -  PT          Wound 07/10/19 1458 Left back incision (Active)   Dressing Appearance dry;intact 7/14/2019  7:30 AM   Closure JESSICA 7/14/2019  7:30 AM   Base dressing in place, unable to visualize 7/14/2019  7:30 AM   Drainage Amount none 7/14/2019  7:30 AM   Dressing Care, Wound foam 7/14/2019  7:30 AM       Wound 07/10/19 1458 back incision (Active)   Dressing Appearance dry;intact;no drainage 7/14/2019  7:30 AM   Closure JESSICA 7/14/2019  7:30 AM   Base dressing in place, unable to visualize 7/14/2019  7:30 AM   Drainage Amount none 7/14/2019  7:30 AM   Dressing Care, Wound low-adherent 7/14/2019  7:30 AM           Physical Therapy Education     Title: PT OT SLP Therapies (Done)     Topic: Physical Therapy (Done)     Point: Mobility training (Done)     Learning Progress Summary           Patient Acceptance, E, VU,NR by MA at 7/12/2019 11:44 AM    Acceptance, E,TB, VU by PRISCILLA at 7/11/2019  4:31 PM                   Point: Home exercise program (Done)     Learning Progress Summary           Patient Acceptance, E, VU,NR by MA at 7/12/2019 11:44 AM    Acceptance, E,TB, VU by PRISCILLA at 7/11/2019  4:31 PM                   Point: Body mechanics (Done)     Learning Progress Summary           Patient Acceptance, E, VU,NR by MA at 7/12/2019 11:44 AM    Acceptance, E,TB, VU by PRISCILLA at 7/11/2019  4:31 PM                   Point: Precautions (Done)     Learning Progress Summary           Patient  Acceptance, E, VU,NR by MA at 7/12/2019 11:44 AM    Acceptance, E,TB, VU by  at 7/11/2019  4:31 PM                               User Key     Initials Effective Dates Name Provider Type Discipline    PRISCILLA 02/05/19 -  Kandi Syed, PT Physical Therapist PT    MA 10/19/18 -  Jennie Salguero, PT Physical Therapist PT                PT Recommendation and Plan  Anticipated Discharge Disposition (PT): skilled nursing facility, home with home health  Therapy Frequency (PT Clinical Impression): daily  Outcome Summary/Treatment Plan (PT)  Daily Summary of Progress (PT): progress toward functional goals is good  Anticipated Discharge Disposition (PT): skilled nursing facility, home with home health  Plan of Care Reviewed With: patient  Progress: improving  Outcome Summary: Pt continuing to improve with mobility and able to ambulate 120' with RW.  Pt with ongoing pain control issues.  Will benefit from continued skilled PT.  Outcome Measures     Row Name 07/14/19 1631 07/13/19 1525 07/12/19 1100       How much help from another person do you currently need...    Turning from your back to your side while in flat bed without using bedrails?  4  -MR  4  -MR  4  -MA    Moving from lying on back to sitting on the side of a flat bed without bedrails?  3  -MR  3  -MR  3  -MA    Moving to and from a bed to a chair (including a wheelchair)?  3  -MR  3  -MR  3  -MA    Standing up from a chair using your arms (e.g., wheelchair, bedside chair)?  3  -MR  3  -MR  3  -MA    Climbing 3-5 steps with a railing?  2  -MR  2  -MR  2  -MA    To walk in hospital room?  3  -MR  3  -MR  3  -MA    AM-PAC 6 Clicks Score (PT)  18  -MR  18  -MR  18  -MA       Functional Assessment    Outcome Measure Options  AM-PAC 6 Clicks Basic Mobility (PT)  -  AM-PAC 6 Clicks Basic Mobility (PT)  -  AM-PAC 6 Clicks Basic Mobility (PT)  -MA      User Key  (r) = Recorded By, (t) = Taken By, (c) = Cosigned By    Initials Name Provider Type    Jennie Pulido  PT Physical Therapist     Jonathan, Dominique PT Physical Therapist         Time Calculation:   PT Charges     Row Name 07/14/19 1632             Time Calculation    Start Time  1533  -MR      Stop Time  1551  -MR      Time Calculation (min)  18 min  -MR      PT Received On  07/14/19  -MR      PT - Next Appointment  07/15/19  -MR         Time Calculation- PT    Total Timed Code Minutes- PT  18 minute(s)  -MR        User Key  (r) = Recorded By, (t) = Taken By, (c) = Cosigned By    Initials Name Provider Type    MR Castillo Dominique, PT Physical Therapist        Therapy Charges for Today     Code Description Service Date Service Provider Modifiers Qty    83074124237 HC PT THERAPEUTIC ACT EA 15 MIN 7/13/2019 Dominique Castillo, PT GP 1    93236631701 HC GAIT TRAINING EA 15 MIN 7/13/2019 Dominique Castillo, PT GP 1    73078314621 HC GAIT TRAINING EA 15 MIN 7/14/2019 Dominique Castillo, PT GP 1          PT G-Codes  Outcome Measure Options: AM-PAC 6 Clicks Basic Mobility (PT)  AM-PAC 6 Clicks Score (PT): 18    Dominique Castillo PT  7/14/2019

## 2019-07-14 NOTE — PLAN OF CARE
Problem: Patient Care Overview  Goal: Plan of Care Review  Outcome: Ongoing (interventions implemented as appropriate)   07/14/19 8410   Coping/Psychosocial   Plan of Care Reviewed With patient   Plan of Care Review   Progress improving   OTHER   Outcome Summary Pt continuing to improve with mobility and able to ambulate 120' with RW. Pt with ongoing pain control issues. Will benefit from continued skilled PT.

## 2019-07-14 NOTE — PLAN OF CARE
Problem: Patient Care Overview  Goal: Plan of Care Review  Outcome: Ongoing (interventions implemented as appropriate)   07/14/19 9042   Coping/Psychosocial   Plan of Care Reviewed With patient   Plan of Care Review   Progress improving   OTHER   Outcome Summary Pt AOx4, VSS, pain controlled with PRN meds, no fever throughout shift. Assist x1 with walker and back brace when ambulating. Drsg to back C/D/I. Plan to d/c to rehab. Will continue to monitor.

## 2019-07-15 LAB
BASOPHILS # BLD AUTO: 0.06 10*3/MM3 (ref 0–0.2)
BASOPHILS NFR BLD AUTO: 0.5 % (ref 0–1.5)
DEPRECATED RDW RBC AUTO: 44 FL (ref 37–54)
EOSINOPHIL # BLD AUTO: 0.45 10*3/MM3 (ref 0–0.4)
EOSINOPHIL NFR BLD AUTO: 4 % (ref 0.3–6.2)
ERYTHROCYTE [DISTWIDTH] IN BLOOD BY AUTOMATED COUNT: 12.7 % (ref 12.3–15.4)
HCT VFR BLD AUTO: 35.1 % (ref 37.5–51)
HGB BLD-MCNC: 12.1 G/DL (ref 13–17.7)
IMM GRANULOCYTES # BLD AUTO: 0.03 10*3/MM3 (ref 0–0.05)
IMM GRANULOCYTES NFR BLD AUTO: 0.3 % (ref 0–0.5)
LYMPHOCYTES # BLD AUTO: 3.93 10*3/MM3 (ref 0.7–3.1)
LYMPHOCYTES NFR BLD AUTO: 34.7 % (ref 19.6–45.3)
MCH RBC QN AUTO: 32.4 PG (ref 26.6–33)
MCHC RBC AUTO-ENTMCNC: 34.5 G/DL (ref 31.5–35.7)
MCV RBC AUTO: 93.9 FL (ref 79–97)
MONOCYTES # BLD AUTO: 1.19 10*3/MM3 (ref 0.1–0.9)
MONOCYTES NFR BLD AUTO: 10.5 % (ref 5–12)
NEUTROPHILS # BLD AUTO: 5.65 10*3/MM3 (ref 1.7–7)
NEUTROPHILS NFR BLD AUTO: 50 % (ref 42.7–76)
NRBC BLD AUTO-RTO: 0 /100 WBC (ref 0–0.2)
PLATELET # BLD AUTO: 410 10*3/MM3 (ref 140–450)
PMV BLD AUTO: 10.2 FL (ref 6–12)
RBC # BLD AUTO: 3.74 10*6/MM3 (ref 4.14–5.8)
WBC NRBC COR # BLD: 11.31 10*3/MM3 (ref 3.4–10.8)

## 2019-07-15 PROCEDURE — 85025 COMPLETE CBC W/AUTO DIFF WBC: CPT | Performed by: HOSPITALIST

## 2019-07-15 PROCEDURE — 97110 THERAPEUTIC EXERCISES: CPT

## 2019-07-15 PROCEDURE — 25010000002 HYDROMORPHONE 1 MG/ML SOLUTION: Performed by: ORTHOPAEDIC SURGERY

## 2019-07-15 PROCEDURE — 99024 POSTOP FOLLOW-UP VISIT: CPT | Performed by: ORTHOPAEDIC SURGERY

## 2019-07-15 RX ADMIN — VARENICLINE TARTRATE 1 MG: 0.5 TABLET, FILM COATED ORAL at 20:12

## 2019-07-15 RX ADMIN — HYDROMORPHONE HYDROCHLORIDE 8 MG: 4 TABLET ORAL at 18:32

## 2019-07-15 RX ADMIN — GABAPENTIN 400 MG: 400 CAPSULE ORAL at 08:32

## 2019-07-15 RX ADMIN — OXCARBAZEPINE 150 MG: 150 TABLET, FILM COATED ORAL at 20:10

## 2019-07-15 RX ADMIN — CYCLOBENZAPRINE 5 MG: 10 TABLET, FILM COATED ORAL at 04:25

## 2019-07-15 RX ADMIN — CYCLOBENZAPRINE 5 MG: 10 TABLET, FILM COATED ORAL at 21:07

## 2019-07-15 RX ADMIN — SERTRALINE 100 MG: 100 TABLET, FILM COATED ORAL at 20:12

## 2019-07-15 RX ADMIN — OXYCODONE HYDROCHLORIDE 10 MG: 10 TABLET, FILM COATED, EXTENDED RELEASE ORAL at 08:32

## 2019-07-15 RX ADMIN — SODIUM CHLORIDE, PRESERVATIVE FREE 3 ML: 5 INJECTION INTRAVENOUS at 08:31

## 2019-07-15 RX ADMIN — CLONAZEPAM 1 MG: 1 TABLET ORAL at 08:32

## 2019-07-15 RX ADMIN — CLONAZEPAM 1 MG: 1 TABLET ORAL at 20:10

## 2019-07-15 RX ADMIN — HYDROMORPHONE HYDROCHLORIDE 8 MG: 4 TABLET ORAL at 02:37

## 2019-07-15 RX ADMIN — HYDROMORPHONE HYDROCHLORIDE 8 MG: 4 TABLET ORAL at 10:33

## 2019-07-15 RX ADMIN — HYDROMORPHONE HYDROCHLORIDE 1 MG: 1 INJECTION, SOLUTION INTRAMUSCULAR; INTRAVENOUS; SUBCUTANEOUS at 20:09

## 2019-07-15 RX ADMIN — DOCUSATE SODIUM 100 MG: 100 CAPSULE, LIQUID FILLED ORAL at 13:33

## 2019-07-15 RX ADMIN — PANTOPRAZOLE SODIUM 40 MG: 40 TABLET, DELAYED RELEASE ORAL at 04:26

## 2019-07-15 RX ADMIN — POLYETHYLENE GLYCOL 3350 17 G: 17 POWDER, FOR SOLUTION ORAL at 13:33

## 2019-07-15 RX ADMIN — CYCLOBENZAPRINE 5 MG: 10 TABLET, FILM COATED ORAL at 13:25

## 2019-07-15 RX ADMIN — SODIUM CHLORIDE, PRESERVATIVE FREE 3 ML: 5 INJECTION INTRAVENOUS at 20:11

## 2019-07-15 RX ADMIN — VARENICLINE TARTRATE 1 MG: 0.5 TABLET, FILM COATED ORAL at 08:32

## 2019-07-15 RX ADMIN — HYDROMORPHONE HYDROCHLORIDE 8 MG: 4 TABLET ORAL at 22:57

## 2019-07-15 RX ADMIN — HYDROMORPHONE HYDROCHLORIDE 1 MG: 1 INJECTION, SOLUTION INTRAMUSCULAR; INTRAVENOUS; SUBCUTANEOUS at 04:25

## 2019-07-15 RX ADMIN — CHOLECALCIFEROL TAB 10 MCG (400 UNIT) 400 UNITS: 10 TAB at 08:32

## 2019-07-15 RX ADMIN — GABAPENTIN 800 MG: 400 CAPSULE ORAL at 20:10

## 2019-07-15 RX ADMIN — OXYCODONE HYDROCHLORIDE 10 MG: 10 TABLET, FILM COATED, EXTENDED RELEASE ORAL at 20:10

## 2019-07-15 RX ADMIN — HYDROMORPHONE HYDROCHLORIDE 8 MG: 4 TABLET ORAL at 06:46

## 2019-07-15 RX ADMIN — HYDROMORPHONE HYDROCHLORIDE 1 MG: 1 INJECTION, SOLUTION INTRAMUSCULAR; INTRAVENOUS; SUBCUTANEOUS at 11:23

## 2019-07-15 RX ADMIN — GABAPENTIN 400 MG: 400 CAPSULE ORAL at 13:25

## 2019-07-15 RX ADMIN — HYDROMORPHONE HYDROCHLORIDE 1 MG: 1 INJECTION, SOLUTION INTRAMUSCULAR; INTRAVENOUS; SUBCUTANEOUS at 15:41

## 2019-07-15 RX ADMIN — HYDROMORPHONE HYDROCHLORIDE 8 MG: 4 TABLET ORAL at 14:37

## 2019-07-15 NOTE — PROGRESS NOTES
LOS: 5 days     Name: Teo Reynoso  Age/Sex: 44 y.o. male  :  1975        PCP: Jt Marino MD  No chief complaint on file.     Subjective   He is postop day 5 from his lumbar spine surgery.  Following the procedure he has had a tremendous issue with pain control.  He had a fever 3 days ago and has had some subjective fevers and chills but nothing is been documented over the last 3 days.  A chest x-ray and a CBC were done at the time of his fever his white blood cell count was elevated at 14 and his chest x-ray showed some atelectasis.  We have been asked to reevaluate the patient.  Again is been fever free at this point but is complaining of subjective fevers and chills.  Otherwise denies any urinary symptoms denies cough or shortness of breath.  Per orthopedics the wound looks good with no purulence or signs of infection.  General: +Fever or Chills, Cardiac: No Chest Pain or Palpitations, Resp: No Cough or SOA, GI: No Nausea, Vomiting, or Diarrhea and Other: No bleeding      cholecalciferol 400 Units Oral Daily   clonazePAM 1 mg Oral BID   cyclobenzaprine 5 mg Oral Q8H   gabapentin 400 mg Oral Daily With Breakfast   gabapentin 400 mg Oral Daily With Lunch   gabapentin 800 mg Oral Nightly   OXcarbazepine 150 mg Oral Nightly   oxyCODONE 10 mg Oral Q12H   pantoprazole 40 mg Oral QAM   sertraline 100 mg Oral Nightly   sodium chloride 3 mL Intravenous Q12H   varenicline 1 mg Oral BID       sodium chloride 100 mL/hr Last Rate: Stopped (19 0523)       Objective   Vital Signs  Temp:  [98.7 °F (37.1 °C)-99.2 °F (37.3 °C)] 98.7 °F (37.1 °C)  Heart Rate:  [61-77] 77  Resp:  [18-20] 18  BP: (119-125)/(69-81) 121/79  Body mass index is 25.13 kg/m².    Intake/Output Summary (Last 24 hours) at 7/15/2019 1001  Last data filed at 7/15/2019 0232  Gross per 24 hour   Intake --   Output 950 ml   Net -950 ml       Physical Exam   Constitutional: He is oriented to person, place, and time. He appears  well-developed and well-nourished. No distress.   Cardiovascular: Normal rate, regular rhythm and normal heart sounds.   Pulmonary/Chest: Effort normal and breath sounds normal. No respiratory distress. He has no wheezes. He has no rales.   Abdominal: Soft. Bowel sounds are normal. He exhibits no distension.   Musculoskeletal: Normal range of motion. He exhibits no edema.   Neurological: He is alert and oriented to person, place, and time.   Skin: Skin is warm. Capillary refill takes less than 2 seconds.   Psychiatric: He has a normal mood and affect. His behavior is normal.   Nursing note and vitals reviewed.        Results Review:       I reviewed the patient's new clinical results.  Results from last 7 days   Lab Units 07/15/19  1023 07/13/19  1304 07/11/19  0405   WBC 10*3/mm3 11.31* 14.60*  --    HEMOGLOBIN g/dL 12.1* 13.3 13.0   PLATELETS 10*3/mm3 410 317  --      Results from last 7 days   Lab Units 07/11/19  0406   SODIUM mmol/L 141   POTASSIUM mmol/L 4.0   CHLORIDE mmol/L 102   CO2 mmol/L 26.0   BUN mg/dL 10   CREATININE mg/dL 0.80   CALCIUM mg/dL 8.8   Estimated Creatinine Clearance: 127.6 mL/min (by C-G formula based on SCr of 0.8 mg/dL).      Assessment/Plan     Lumbar spinal stenosis      PLAN  1.  Fevers chills and leukocytosis: I repeated a white blood cell count today it is near normal lateral at 11.  There is no left shift to this making a bacterial infection less likely.  I continue to feel like this is probably related to atelectasis and poor mobility.  I have asked the patient to be aggressive with his incentive spirometer and to get up out of the bed as much as possible today.  From my standpoint is cleared to discharge to skilled nursing facility for further rehab facility as recommended by the primary team.  I do not see any indication for antibiotics at this time.  I discussed this with the patient at the bedside addressed all questions to the best of my ability.      Disposition  Per primary  but no medical contraindication with discharge      Sg Bowens MD  [facility]  07/15/19  10:01 AM

## 2019-07-15 NOTE — PROGRESS NOTES
Orthopedic Spine Progress Note    Subjective     Post-Operative Day: 5 post-spine procedure L3-5 MINIMALLY INVASIVE TRANSFORMINAL LUMBAR INTERBODY FUSION  Systemic or Specific Complaints: Pain Control    Objective     Vital signs in last 24 hours:  Temp:  [97.8 °F (36.6 °C)-99.2 °F (37.3 °C)] 98.7 °F (37.1 °C)  Heart Rate:  [61-78] 77  Resp:  [18-20] 18  BP: (115-125)/(69-81) 121/79    General: alert, appears stated age and cooperative   Neurovascular: stable   Wound: Wound clean and dry no evidence of infection  Diffuse edema.   Range of Motion: limited   DVT Exam: No evidence of DVT seen on physical exam.     Data Review  CBC:  Results from last 7 days   Lab Units 07/13/19  1304   WBC 10*3/mm3 14.60*   RBC 10*6/mm3 4.17   HEMOGLOBIN g/dL 13.3   HEMATOCRIT % 39.6   PLATELETS 10*3/mm3 317     Lab Results   Component Value Date    GLUCOSE 111 (H) 07/11/2019    BUN 10 07/11/2019    CREATININE 0.80 07/11/2019    EGFRIFNONA 106 07/11/2019    BCR 12.5 07/11/2019    K 4.0 07/11/2019    CO2 26.0 07/11/2019    CALCIUM 8.8 07/11/2019    ALBUMIN 4.20 07/05/2019    LABIL2 1.9 10/01/2018    AST 14 07/05/2019    ALT 9 07/05/2019       Assessment/Plan     Status post-spine procedure:  Pain Relief: some relief  Okay to discharge to rehabilitation from a spine standpoint.  However the patient has been running a fever and has leukocytosis.  Minimal changes noted on chest x-ray.  We are requesting the hospitalist's input on this.      Activity: out of bed and ambulate    Weight Bearing: FWB     LOS: 5 days     Luther Navarrete DO    Date: 7/15/2019

## 2019-07-15 NOTE — THERAPY TREATMENT NOTE
Acute Care - Physical Therapy Treatment Note  [facility]     Patient Name: Teo Reynoso  : 1975  MRN: [id number]  Today's Date: 7/15/2019             Admit Date: 7/10/2019    Visit Dx:  No diagnosis found.  Patient Active Problem List   Diagnosis   • Lumbar spinal stenosis       Therapy Treatment    Rehabilitation Treatment Summary     Row Name 07/15/19 1605             Treatment Time/Intention    Discipline  physical therapy assistant  -      Document Type  therapy note (daily note)  -      Subjective Information  complains of;pain  -      Mode of Treatment  physical therapy  -      Patient/Family Observations  pt supine in bed  -      Care Plan Review  patient/other agree to care plan  -      Therapy Frequency (PT Clinical Impression)  daily  -EH      Patient Effort  good  -      Existing Precautions/Restrictions  fall;spinal;brace worn when out of bed  -      Recorded by [] Georgia Hernández, [facility] 07/15/19 1609      Row Name 07/15/19 1605             Cognitive Assessment/Intervention- PT/OT    Orientation Status (Cognition)  oriented x 4  -      Follows Commands (Cognition)  WNL  -      Personal Safety Interventions  fall prevention program maintained;gait belt;nonskid shoes/slippers when out of bed  -      Recorded by [] Georgia Hernández, BERNADETTE 07/15/19 1609      Row Name 07/15/19 1605             Bed Mobility Assessment/Treatment    Rolling Right [place] (Bed Mobility)  supervision  -      Sidelying-Sit [place] (Bed Mobility)  supervision  -      Assistive Device (Bed Mobility)  bed rails  -      Recorded by [] Georgia Hernández PTA 07/15/19 1609      Row Name 07/15/19 1605             Sit-Stand Transfer    Sit-Stand [place] (Transfers)  contact guard  -      Assistive Device (Sit-Stand Transfers)  walker, front-wheeled  -      Recorded by [] Georgia Hernández PTA 07/15/19 1609      Row Name 07/15/19 1605             Stand-Sit Transfer    Stand-Sit [place]  (Transfers)  verbal cues;contact guard  -      Assistive Device (Stand-Sit Transfers)  walker, front-wheeled  -      Recorded by [] Georgia Hernández [facility] 07/15/19 1609      Row Name 07/15/19 1605             Gait/Stairs Assessment/Training    [place] Level (Gait)  contact guard;verbal cues  -      Assistive Device (Gait)  walker, front-wheeled  -      Distance in Feet (Gait)  250  -EH      Pattern (Gait)  step-through  -      Deviations/Abnormal Patterns (Gait)  antalgic;gait speed decreased;stride length decreased;tameka decreased  -      Bilateral Gait Deviations  forward flexed posture  -      Recorded by [] Georgia Hernández [facility] [place] [facility] 07/15/19 1609      Row Name 07/15/19 1605             Positioning and Restraints    Pre-Treatment Position  in bed  -      Post Treatment Position  bed  -EH      In Bed  supine;call light within reach;encouraged to call for assist;exit alarm on  -EH      Recorded by [] Georgia Hernández [facility] [place] [facility] 07/15/19 1609      Row Name 07/15/19 1605             Pain Scale: Numbers Pre/Post-Treatment    Pain Location - Orientation  lower  -EH      Pain Location  back  -EH      Pain Intervention(s)  Repositioned;Ambulation/increased activity  -      Recorded by [] Georgia Hernández [facility] [place] [facility] 07/15/19 1609      Row Name 07/15/19 1605             Pain Scale: Word Pre/Post-Treatment    Pain: Word Scale, Pretreatment  4 - moderate pain  -      Recorded by [] Georgia Hernández [facility] [place] [facility] 07/15/19 1609      Row Name                Wound 07/10/19 1458 Left back incision    Wound - Properties Group Date first assessed: 07/10/19 [AS] Time first assessed: 1458 [AS] Side: Left [AS] Location: back [AS] Type: incision [AS] Recorded by:  [AS] Yuliana Frye RN 07/10/19 1458    Row Name                Wound 07/10/19 1458 back incision    Wound - Properties Group Date first assessed: 07/10/19 [AS] Time first assessed: 1458 [AS] Location: back [AS] Type: incision [AS] Recorded by:  [AS] Yuliana Frye RN 07/10/19 1458     Row Name 07/15/19 1605             Outcome Summary/Treatment Plan (PT)    Anticipated Discharge Disposition (PT)  skilled nursing facility;home with home health  -EH      Recorded by [] Georgia Hernández PTA 07/15/19 1601        User Key  (r) = Recorded By, (t) = Taken By, (c) = Cosigned By    Initials Name Effective Dates Discipline    AS Yuliana Frye RN 12/13/16 -  Nurse     Georgia Hernández, BERNADETTE 08/19/18 -  PT          Wound 07/10/19 1458 Left back incision (Active)   Dressing Appearance dry;intact 7/15/2019  8:31 AM   Closure JESSICA 7/15/2019  8:31 AM   Base dressing in place, unable to visualize 7/15/2019  8:31 AM   Drainage Amount none 7/15/2019  8:31 AM   Dressing Care, Wound low-adherent 7/15/2019  8:31 AM       Wound 07/10/19 1458 back incision (Active)   Dressing Appearance intact;dry 7/15/2019  8:31 AM   Closure JESSICA 7/15/2019  8:31 AM   Base dressing in place, unable to visualize 7/15/2019  8:31 AM   Drainage Amount none 7/15/2019  8:31 AM   Dressing Care, Wound low-adherent 7/15/2019  8:31 AM           Physical Therapy Education     Title: PT OT SLP Therapies (Done)     Topic: Physical Therapy (Done)     Point: Mobility training (Done)     Learning Progress Summary           Patient Acceptance, E,D, VU,NR by  at 7/15/2019  4:05 PM    Acceptance, E, VU,NR by MA at 7/12/2019 11:44 AM    Acceptance, E,TB, VU by PRISCILLA at 7/11/2019  4:31 PM                   Point: Home exercise program (Done)     Learning Progress Summary           Patient Acceptance, E,D, VU,NR by  at 7/15/2019  4:05 PM    Acceptance, E, VU,NR by MA at 7/12/2019 11:44 AM    Acceptance, E,TB, VU by PRISCILLA at 7/11/2019  4:31 PM                   Point: Body mechanics (Done)     Learning Progress Summary           Patient Acceptance, E,D, VU,NR by  at 7/15/2019  4:05 PM    Acceptance, E, VU,NR by MA at 7/12/2019 11:44 AM    Acceptance, E,TB, VU by PRISCILLA at 7/11/2019  4:31 PM                   Point: Precautions (Done)     Learning Progress Summary            Patient Acceptance, E,D, VU,NR by  at 7/15/2019  4:05 PM    Acceptance, E, VU,NR by MA at 7/12/2019 11:44 AM    Acceptance, E,TB, VU by  at 7/11/2019  4:31 PM                               User Key     Initials Effective Dates Name Provider Type Discipline     02/05/19 -  Kandi Syed, PT Physical Therapist PT     08/19/18 -  Georgia Hernández, PTA Physical Therapy Assistant PT    MA 10/19/18 -  Jennie Salguero, PT Physical Therapist PT                PT Recommendation and Plan  Anticipated Discharge Disposition (PT): skilled nursing facility, home with home health  Therapy Frequency (PT Clinical Impression): daily  Outcome Summary/Treatment Plan (PT)  Anticipated Discharge Disposition (PT): skilled nursing facility, home with home health  Plan of Care Reviewed With: patient  Progress: improving  Outcome Summary: Pt tolerated treatment with c/o pain. Pt requires supervision for bed mobility and CGA for sit/stand transfers. Pt ambulated 250 feet with rwx, CGA. Pt demonstrates decreased gait speed and stride length. pt will continue to benefit from skilled PT to improve strength and overall functional mobility to progress to independent and decrease falls risk   Outcome Measures     Row Name 07/15/19 1600 07/14/19 1631 07/13/19 1525       How much help from another person do you currently need...    Turning from your back to your side while in flat bed without using bedrails?  4  -EH  4  -MR  4  -MR    Moving from lying on back to sitting on the side of a flat bed without bedrails?  3  -  3  -MR  3  -MR    Moving to and from a bed to a chair (including a wheelchair)?  3  -  3  -MR  3  -MR    Standing up from a chair using your arms (e.g., wheelchair, bedside chair)?  3  -  3  -MR  3  -MR    Climbing 3-5 steps with a railing?  2  -  2  -MR  2  -MR    To walk in hospital room?  3  -  3  -MR  3  -MR    AM-PAC 6 Clicks Score (PT)  18  -  18  -MR  18  -MR       Functional Assessment    Outcome  Measure Options  --  AM-PAC 6 Clicks Basic Mobility (PT)  -MR  AM-PAC 6 Clicks Basic Mobility (PT)  -MR      User Key  (r) = Recorded By, (t) = Taken By, (c) = Cosigned By    Initials Name Provider Type    Georgia Up PTA Physical Therapy Assistant    MR Castillo, Dominique, PT Physical Therapist         Time Calculation:   PT Charges     Row Name 07/15/19 1604             Time Calculation    Start Time  1528  -EH      Stop Time  1539  -      Time Calculation (min)  11 min  -      PT Received On  07/15/19  -      PT - Next Appointment  07/16/19  -         Time Calculation- PT    Total Timed Code Minutes- PT  11 minute(s)  -        User Key  (r) = Recorded By, (t) = Taken By, (c) = Cosigned By    Initials Name Provider Type    Georgia Up PTA Physical Therapy Assistant        Therapy Charges for Today     Code Description Service Date Service Provider Modifiers Qty    85308771547 HC PT THER PROC EA 15 MIN 7/15/2019 Georgia Hernández PTA GP 1          PT G-Codes  Outcome Measure Options: AM-PAC 6 Clicks Basic Mobility (PT)  AM-PAC 6 Clicks Score (PT): 18    Georgia Hernández PTA  7/15/2019

## 2019-07-15 NOTE — PLAN OF CARE
Problem: Patient Care Overview  Goal: Plan of Care Review  Outcome: Ongoing (interventions implemented as appropriate)   07/15/19 5926   Coping/Psychosocial   Plan of Care Reviewed With patient   Plan of Care Review   Progress improving   OTHER   Outcome Summary Encouraged to only use IV dilaudid when in severe pain. No fever throughout shift. Incisional dressings CDI. Some swelling on left flank around incision.        Problem: Laminectomy/Foraminotomy/Discectomy (Adult)  Goal: Signs and Symptoms of Listed Potential Problems Will be Absent, Minimized or Managed (Laminectomy/Foraminotomy/Discectomy)  Outcome: Ongoing (interventions implemented as appropriate)

## 2019-07-15 NOTE — PROGRESS NOTES
Continued Stay Note  [facility]     Patient Name: Teo Reynoso  MRN: [id number]  Today's Date: 7/15/2019    Admit Date: 7/10/2019    Discharge Plan     Row Name 07/15/19 1631       Plan    Plan  F/U for d/c planning     Plan Comments  Per Hoang with [facility] regarding [place] the patient ambulated 250 feet with P.T and she is unable to accept due to his current level of functioning.  FATIMAH Silverman        Discharge Codes    No documentation.             FATIMAH Driver

## 2019-07-15 NOTE — PROGRESS NOTES
Continued Stay Note  Psychiatric     Patient Name: Teo Reynoso  MRN: [id number]  Today's Date: 7/15/2019    Admit Date: 7/10/2019    Discharge Plan     Row Name 07/15/19 8319       Plan    Plan  pending    Patient/Family in Agreement with Plan  yes    Plan Comments  Spoke with patient to inform him that he is unable to go to [place] due to his increasing ambulation level. Patient stated he can not return to boarding house he was at prior to admission. Patient states all his family lives in [place].  Stated he would try to contact family. Discussed that CCP will F/U in am. Updated RN    Row Name 07/15/19 4343       Plan    Plan  F/U for d/c planning     Plan Comments  Per Hoang with [facility] regarding [place] the patient ambulated 250 feet with P.T and she is unable to accept due to his current level of functioning.  FATIMAH Silverman        Discharge Codes    No documentation.             Brianna Aparicio RN

## 2019-07-15 NOTE — PLAN OF CARE
Problem: Patient Care Overview  Goal: Plan of Care Review  Outcome: Ongoing (interventions implemented as appropriate)   07/15/19 7978   Coping/Psychosocial   Plan of Care Reviewed With patient   Plan of Care Review   Progress improving   OTHER   Outcome Summary Pt tolerated treatment with c/o pain. Pt requires supervision for bed mobility and CGA for sit/stand transfers. Pt ambulated 250 feet with rwx, CGA. Pt demonstrates decreased gait speed and stride length. pt will continue to benefit from skilled PT to improve strength and overall functional mobility to progress to independent and decrease falls risk

## 2019-07-16 VITALS
OXYGEN SATURATION: 92 % | HEART RATE: 76 BPM | WEIGHT: 165.25 LBS | RESPIRATION RATE: 17 BRPM | TEMPERATURE: 98.7 F | SYSTOLIC BLOOD PRESSURE: 128 MMHG | HEIGHT: 68 IN | BODY MASS INDEX: 25.05 KG/M2 | DIASTOLIC BLOOD PRESSURE: 77 MMHG

## 2019-07-16 PROCEDURE — 97110 THERAPEUTIC EXERCISES: CPT

## 2019-07-16 PROCEDURE — 25010000002 HYDROMORPHONE 1 MG/ML SOLUTION: Performed by: ORTHOPAEDIC SURGERY

## 2019-07-16 PROCEDURE — 99024 POSTOP FOLLOW-UP VISIT: CPT | Performed by: ORTHOPAEDIC SURGERY

## 2019-07-16 RX ORDER — OXYCODONE HCL 20 MG/1
20 TABLET, FILM COATED, EXTENDED RELEASE ORAL EVERY 12 HOURS
Qty: 60 TABLET | Refills: 0
Start: 2019-07-16

## 2019-07-16 RX ORDER — HYDROMORPHONE HYDROCHLORIDE 8 MG/1
8 TABLET ORAL EVERY 6 HOURS PRN
Qty: 60 TABLET | Refills: 0
Start: 2019-07-16

## 2019-07-16 RX ADMIN — HYDROMORPHONE HYDROCHLORIDE 1 MG: 1 INJECTION, SOLUTION INTRAMUSCULAR; INTRAVENOUS; SUBCUTANEOUS at 03:45

## 2019-07-16 RX ADMIN — CHOLECALCIFEROL TAB 10 MCG (400 UNIT) 400 UNITS: 10 TAB at 08:13

## 2019-07-16 RX ADMIN — CYCLOBENZAPRINE 5 MG: 10 TABLET, FILM COATED ORAL at 14:19

## 2019-07-16 RX ADMIN — HYDROMORPHONE HYDROCHLORIDE 1 MG: 1 INJECTION, SOLUTION INTRAMUSCULAR; INTRAVENOUS; SUBCUTANEOUS at 07:01

## 2019-07-16 RX ADMIN — SODIUM CHLORIDE, PRESERVATIVE FREE 3 ML: 5 INJECTION INTRAVENOUS at 09:45

## 2019-07-16 RX ADMIN — GABAPENTIN 400 MG: 400 CAPSULE ORAL at 12:13

## 2019-07-16 RX ADMIN — HYDROMORPHONE HYDROCHLORIDE 8 MG: 4 TABLET ORAL at 03:12

## 2019-07-16 RX ADMIN — GABAPENTIN 400 MG: 400 CAPSULE ORAL at 08:13

## 2019-07-16 RX ADMIN — HYDROMORPHONE HYDROCHLORIDE 8 MG: 4 TABLET ORAL at 09:44

## 2019-07-16 RX ADMIN — CYCLOBENZAPRINE 5 MG: 10 TABLET, FILM COATED ORAL at 06:08

## 2019-07-16 RX ADMIN — PANTOPRAZOLE SODIUM 40 MG: 40 TABLET, DELAYED RELEASE ORAL at 06:08

## 2019-07-16 RX ADMIN — HYDROMORPHONE HYDROCHLORIDE 8 MG: 4 TABLET ORAL at 14:19

## 2019-07-16 RX ADMIN — HYDROMORPHONE HYDROCHLORIDE 1 MG: 1 INJECTION, SOLUTION INTRAMUSCULAR; INTRAVENOUS; SUBCUTANEOUS at 16:08

## 2019-07-16 RX ADMIN — OXYCODONE HYDROCHLORIDE 10 MG: 10 TABLET, FILM COATED, EXTENDED RELEASE ORAL at 08:13

## 2019-07-16 RX ADMIN — HYDROMORPHONE HYDROCHLORIDE 1 MG: 1 INJECTION, SOLUTION INTRAMUSCULAR; INTRAVENOUS; SUBCUTANEOUS at 12:09

## 2019-07-16 RX ADMIN — VARENICLINE TARTRATE 1 MG: 0.5 TABLET, FILM COATED ORAL at 08:13

## 2019-07-16 RX ADMIN — CLONAZEPAM 1 MG: 1 TABLET ORAL at 08:13

## 2019-07-16 NOTE — PLAN OF CARE
Problem: Patient Care Overview  Goal: Plan of Care Review  Outcome: Ongoing (interventions implemented as appropriate)   07/16/19 0252   Coping/Psychosocial   Plan of Care Reviewed With patient   Plan of Care Review   Progress no change   OTHER   Outcome Summary Patient reports pain that is controlled with PRN medications. VSS.       Problem: Pain, Chronic (Adult)  Goal: Acceptable Pain/Comfort Level and Functional Ability  Outcome: Ongoing (interventions implemented as appropriate)      Problem: Laminectomy/Foraminotomy/Discectomy (Adult)  Goal: Signs and Symptoms of Listed Potential Problems Will be Absent, Minimized or Managed (Laminectomy/Foraminotomy/Discectomy)  Outcome: Ongoing (interventions implemented as appropriate)

## 2019-07-16 NOTE — PLAN OF CARE
Problem: Patient Care Overview  Goal: Plan of Care Review  Outcome: Ongoing (interventions implemented as appropriate)   07/16/19 1039   Coping/Psychosocial   Plan of Care Reviewed With patient   Plan of Care Review   Progress improving   OTHER   Outcome Summary pt tolerated treatment with c/o weakness and pain of bilateral LEs and back. Pt requires supervision for bed mobility and CGA for sit/stand transfers. Pt ambulated 70 feet with rwx, CGA. pt deferred futher ambulation secondary to pain and weakness of the bilateral LEs. During ambulation, pt reported if it was not for UE strength and rwx hold himself up, pt's LE's would give out. Pt would continue to benefit from skilled PT (SNF) to improve bilateral LE strength and overall functional mobility to decrease falls risk and to progress towards independence with ADLs.          (4) no impairment

## 2019-07-16 NOTE — PROGRESS NOTES
Orthopedic Spine Progress Note    Subjective     Post-Operative Day: 6 post-spine procedure L3-5 MINIMALLY INVASIVE TRANSFORMINAL LUMBAR INTERBODY FUSION  Systemic or Specific Complaints: Pain Control    Objective     Vital signs in last 24 hours:  Temp:  [98.1 °F (36.7 °C)-98.8 °F (37.1 °C)] 98.1 °F (36.7 °C)  Heart Rate:  [78-94] 79  Resp:  [18] 18  BP: (114-134)/(75-85) 134/85    General: alert, appears stated age and cooperative   Neurovascular: stable   Wound: Wound clean and dry no evidence of infection  Diffuse edema.   Range of Motion: limited   DVT Exam: No evidence of DVT seen on physical exam.     Data Review  CBC:  Results from last 7 days   Lab Units 07/15/19  1023   WBC 10*3/mm3 11.31*   RBC 10*6/mm3 3.74*   HEMOGLOBIN g/dL 12.1*   HEMATOCRIT % 35.1*   PLATELETS 10*3/mm3 410     Lab Results   Component Value Date    GLUCOSE 111 (H) 07/11/2019    BUN 10 07/11/2019    CREATININE 0.80 07/11/2019    EGFRIFNONA 106 07/11/2019    BCR 12.5 07/11/2019    K 4.0 07/11/2019    CO2 26.0 07/11/2019    CALCIUM 8.8 07/11/2019    ALBUMIN 4.20 07/05/2019    LABIL2 1.9 10/01/2018    AST 14 07/05/2019    ALT 9 07/05/2019       Assessment/Plan     Status post-spine procedure:  Pain Relief: some relief  Okay to discharge to rehabilitation from a spine standpoint. I do believe the patient needs LISA due to his unstable home environment. He lives at a boarding house and there is a high chance he will be readmitted for infection if he is unable to get adequate home health care.If he does not qualify for rehabilitation, I would change his Optifoam bandages, replacing them with new Optifoam bandages which can be left on for a week.   Patient has signed prescriptions on chart.  Follow up in office in 1-2 weeks.      Activity: out of bed and ambulate    Weight Bearing: FWB     LOS: 6 days     HECTOR Montez    Date: 7/16/2019

## 2019-07-16 NOTE — THERAPY TREATMENT NOTE
Acute Care - Physical Therapy Treatment Note  [facility]     Patient Name: Teo Reynoso  : 1975  MRN: [id number]  Today's Date: 2019             Admit Date: 7/10/2019    Visit Dx:  No diagnosis found.  Patient Active Problem List   Diagnosis   • Lumbar spinal stenosis       Therapy Treatment    Rehabilitation Treatment Summary     Row Name 19 1029             Treatment Time/Intention    Discipline  physical therapy assistant  -      Document Type  therapy note (daily note)  -      Subjective Information  complains of;pain;weakness  -      Mode of Treatment  physical therapy  -      Patient/Family Observations  pt supine in bed  -      Care Plan Review  patient/other agree to care plan  -      Therapy Frequency (PT Clinical Impression)  daily  -      Patient Effort  good  -      Existing Precautions/Restrictions  brace worn when out of bed;fall;spinal  -      Recorded by [] Georgia Hernández PTA 19 1034      Row Name 19 1029             Cognitive Assessment/Intervention- PT/OT    Orientation Status (Cognition)  oriented x 4  -      Follows Commands (Cognition)  WNL  -      Personal Safety Interventions  fall prevention program maintained;gait belt;nonskid shoes/slippers when out of bed  -      Recorded by [] Georgia Hernández PTA 19 1034      Row Name 19 1029             Bed Mobility Assessment/Treatment    Rolling Right [place] (Bed Mobility)  supervision  -      Sidelying-Sit [place] (Bed Mobility)  supervision  -      Sit-Sidelying [place] (Bed Mobility)  supervision  -      Assistive Device (Bed Mobility)  bed rails;head of bed elevated  -      Recorded by [] Georgia Hernández PTA 19 1034      Row Name 19 1029             Sit-Stand Transfer    Sit-Stand [place] (Transfers)  contact guard  -      Assistive Device (Sit-Stand Transfers)  walker, front-wheeled  -      Recorded by [] Georgia Hernández, PTA  07/16/19 1034      Row Name 07/16/19 1029             Stand-Sit Transfer    Stand-Sit [place] (Transfers)  contact guard  -      Assistive Device (Stand-Sit Transfers)  walker, front-wheeled  -EH      Recorded by [] Georgia Hernández PTA 07/16/19 1034      Row Name 07/16/19 1029             Gait/Stairs Assessment/Training    [place] Level (Gait)  contact guard  -EH      Assistive Device (Gait)  walker, front-wheeled  -EH      Distance in Feet (Gait)  70  -EH      Pattern (Gait)  step-through  -EH      Deviations/Abnormal Patterns (Gait)  antalgic;gait speed decreased;stride length decreased;tameka decreased  -EH      Bilateral Gait Deviations  forward flexed posture  -EH      Comment (Gait/Stairs)  pt deferred to ambulate further due to increased pain. Pt felt bilateral LE's would give out. Pt reports relying on UE strength and rwx to hold  himself up.   -      Recorded by [] Georgia Hernández PTA 07/16/19 1034      Row Name 07/16/19 1029             Positioning and Restraints    Pre-Treatment Position  in bed  -      Post Treatment Position  bed  -EH      In Bed  supine;call light within reach;encouraged to call for assist;exit alarm on  -      Recorded by [] Georgia Hernández PTA 07/16/19 1034      Row Name 07/16/19 1029             Pain Scale: Numbers Pre/Post-Treatment    Pain Location - Orientation  lower  -EH      Pain Location  back lower extremities  -EH      Pain Intervention(s)  Repositioned;Ambulation/increased activity  -      Recorded by [] Georgia Hernández PTA 07/16/19 1034      Row Name 07/16/19 1029             Pain Scale: Word Pre/Post-Treatment    Pain: Word Scale, Pretreatment  6 - moderate-severe pain  -      Recorded by [] Georgia Hernández PTA 07/16/19 1034      Row Name                Wound 07/10/19 1458 Left back incision    Wound - Properties Group Date first assessed: 07/10/19 [AS] Time first assessed: 1458 [AS] Side: Left [AS] Location: back [AS] Type: incision [AS]  Recorded by:  [AS] Yuliana Frye RN 07/10/19 1458    Row Name                Wound 07/10/19 1458 back incision    Wound - Properties Group Date first assessed: 07/10/19 [AS] Time first assessed: 1458 [AS] Location: back [AS] Type: incision [AS] Recorded by:  [AS] Yuliana Frye RN 07/10/19 1458      User Key  (r) = Recorded By, (t) = Taken By, (c) = Cosigned By    Initials Name Effective Dates Discipline    AS Yuliana Frye RN 12/13/16 -  Nurse     Georgia Hernández PTA 08/19/18 -  PT          Wound 07/10/19 1458 Left back incision (Active)   Dressing Appearance dry;intact 7/16/2019  8:15 AM   Closure JESSICA 7/16/2019  8:15 AM   Base dressing in place, unable to visualize 7/16/2019  8:15 AM   Drainage Amount none 7/16/2019 12:00 AM       Wound 07/10/19 1458 back incision (Active)   Dressing Appearance intact;dry 7/16/2019 12:00 AM   Closure JESSICA 7/16/2019 12:00 AM   Base dressing in place, unable to visualize 7/16/2019 12:00 AM   Drainage Amount none 7/16/2019 12:00 AM           Physical Therapy Education     Title: PT OT SLP Therapies (Done)     Topic: Physical Therapy (Done)     Point: Mobility training (Done)     Learning Progress Summary           Patient Acceptance, E,D, VU,NR by  at 7/16/2019 10:28 AM    Acceptance, E, VU by FRANK at 7/16/2019  1:55 AM    Acceptance, E,D, VU,NR by  at 7/15/2019  4:05 PM    Acceptance, E, VU,NR by MA at 7/12/2019 11:44 AM    Acceptance, E,TB, VU by PRISCILLA at 7/11/2019  4:31 PM                   Point: Home exercise program (Done)     Learning Progress Summary           Patient Acceptance, E,D, VU,NR by  at 7/16/2019 10:28 AM    Acceptance, E, VU by RB at 7/16/2019  1:55 AM    Acceptance, E,D, VU,NR by  at 7/15/2019  4:05 PM    Acceptance, E, VU,NR by MA at 7/12/2019 11:44 AM    Acceptance, E,TB, VU by PRISCILLA at 7/11/2019  4:31 PM                   Point: Body mechanics (Done)     Learning Progress Summary           Patient Acceptance, BELEN ZULUAGA, CHELSY,NR by  at 7/16/2019 10:28 AM    MARGARETH Lobo  VU by  at 7/16/2019  1:55 AM    Acceptance, E,D, VU,NR by  at 7/15/2019  4:05 PM    Acceptance, E, VU,NR by MA at 7/12/2019 11:44 AM    Acceptance, E,TB, VU by  at 7/11/2019  4:31 PM                   Point: Precautions (Done)     Learning Progress Summary           Patient Acceptance, E,D, VU,NR by  at 7/16/2019 10:28 AM    Acceptance, E, VU by  at 7/16/2019  1:55 AM    Acceptance, E,D, VU,NR by  at 7/15/2019  4:05 PM    Acceptance, E, VU,NR by MA at 7/12/2019 11:44 AM    Acceptance, E,TB, VU by  at 7/11/2019  4:31 PM                               User Key     Initials Effective Dates Name Provider Type Discipline     02/05/19 -  Kandi Syed, PT Physical Therapist PT     01/18/17 -  Meek Pagan RN Registered Nurse Nurse     08/19/18 -  Georgia Hernández, BERNADETTE Physical Therapy Assistant PT    MA 10/19/18 -  Jennie Salguero, PT Physical Therapist PT                PT Recommendation and Plan  Anticipated Discharge Disposition (PT): skilled nursing facility, home with home health  Therapy Frequency (PT Clinical Impression): daily  Outcome Summary/Treatment Plan (PT)  Anticipated Discharge Disposition (PT): skilled nursing facility, home with home health  Plan of Care Reviewed With: patient  Progress: improving  Outcome Summary: Pt tolerated treatment with c/o pain. Pt requires supervision for bed mobility and CGA for sit/stand transfers. Pt ambulated 250 feet with rwx, CGA. Pt demonstrates decreased gait speed and stride length. pt will continue to benefit from skilled PT to improve strength and overall functional mobility to progress to independent and decrease falls risk   Outcome Measures     Row Name 07/16/19 1000 07/15/19 1600 07/14/19 1631       How much help from another person do you currently need...    Turning from your back to your side while in flat bed without using bedrails?  4  -EH  4  -EH  4  -MR    Moving from lying on back to sitting on the side of a flat bed without  bedrails?  3  -EH  3  -EH  3  -MR    Moving to and from a bed to a chair (including a wheelchair)?  3  -EH  3  -EH  3  -MR    Standing up from a chair using your arms (e.g., wheelchair, bedside chair)?  3  -EH  3  -EH  3  -MR    Climbing 3-5 steps with a railing?  2  -EH  2  -EH  2  -MR    To walk in hospital room?  3  -EH  3  -EH  3  -MR    AM-PAC 6 Clicks Score (PT)  18  -EH  18  -EH  18  -MR       Functional Assessment    Outcome Measure Options  --  --  AM-PAC 6 Clicks Basic Mobility (PT)  -MR    Row Name 07/13/19 1525             How much help from another person do you currently need...    Turning from your back to your side while in flat bed without using bedrails?  4  -MR      Moving from lying on back to sitting on the side of a flat bed without bedrails?  3  -MR      Moving to and from a bed to a chair (including a wheelchair)?  3  -MR      Standing up from a chair using your arms (e.g., wheelchair, bedside chair)?  3  -MR      Climbing 3-5 steps with a railing?  2  -MR      To walk in hospital room?  3  -MR      AM-PAC 6 Clicks Score (PT)  18  -MR         Functional Assessment    Outcome Measure Options  AM-PAC 6 Clicks Basic Mobility (PT)  -MR        User Key  (r) = Recorded By, (t) = Taken By, (c) = Cosigned By    Initials Name Provider Type     Georgia Hernández PTA Physical Therapy Assistant    MR Castillo Dominique, PT Physical Therapist         Time Calculation:   PT Charges     Row Name 07/16/19 1028             Time Calculation    Start Time  1010  -      Stop Time  1025  -      Time Calculation (min)  15 min  -      PT Received On  07/16/19  -      PT - Next Appointment  07/17/19  -         Time Calculation- PT    Total Timed Code Minutes- PT  15 minute(s)  -        User Key  (r) = Recorded By, (t) = Taken By, (c) = Cosigned By    Initials Name Provider Type     Georgia Hernández PTA Physical Therapy Assistant        Therapy Charges for Today     Code Description Service Date Service  Provider Modifiers Qty    77783364588 HC PT THER PROC EA 15 MIN 7/15/2019 Georgia Hernández PTA GP 1    62322047546 HC PT THER PROC EA 15 MIN 7/16/2019 Georgia Hernández PTA GP 1          PT G-Codes  Outcome Measure Options: AM-PAC 6 Clicks Basic Mobility (PT)  AM-PAC 6 Clicks Score (PT): 18    Georgia Hernández PTA  7/16/2019

## 2019-07-16 NOTE — PROGRESS NOTES
Continued Stay Note  [facility]     Patient Name: Teo Reynoso  MRN: [id number]  Today's Date: 7/16/2019    Admit Date: 7/10/2019    Discharge Plan     Row Name 07/16/19 1634       Plan    Plan  Skilled bed at [facility]    Patient/Family in Agreement with Plan  yes    Plan Comments  [employer] setup for transportation for approx 5pm. RN updated. Notified Donna with [facility].    Row Name 07/16/19 1443       Plan    Plan  Skilled bed at [facility]    Patient/Family in Agreement with Plan  yes    Plan Comments  Spoke to the Donna with [facility] who re-evaluated the patient and stated that they can accept the patient today at [facility].  The patient requested transportation assistance and [employer] wheelchair transport arrangements will need to be made for the patient.  FATIMAH Silverman        Discharge Codes    No documentation.       Expected Discharge Date and Time     Expected Discharge Date Expected Discharge Time    Jul 16, 2019             Brianna Aparicio RN

## 2019-07-16 NOTE — PROGRESS NOTES
LOS: 6 days     Name: Teo Reynoso  Age/Sex: 44 y.o. male  :  1975        PCP: Jt Marino MD  No chief complaint on file.     Subjective   Afebrile overnight still having issues with pain but this is improving  General: NoFever or Chills, Cardiac: No Chest Pain or Palpitations, Resp: No Cough or SOA, GI: No Nausea, Vomiting, or Diarrhea and Other: No bleeding      cholecalciferol 400 Units Oral Daily   clonazePAM 1 mg Oral BID   cyclobenzaprine 5 mg Oral Q8H   gabapentin 400 mg Oral Daily With Breakfast   gabapentin 400 mg Oral Daily With Lunch   gabapentin 800 mg Oral Nightly   OXcarbazepine 150 mg Oral Nightly   oxyCODONE 10 mg Oral Q12H   pantoprazole 40 mg Oral QAM   sertraline 100 mg Oral Nightly   sodium chloride 3 mL Intravenous Q12H   varenicline 1 mg Oral BID          Objective   Vital Signs  Temp:  [98.1 °F (36.7 °C)-98.8 °F (37.1 °C)] 98.1 °F (36.7 °C)  Heart Rate:  [78-94] 79  Resp:  [18] 18  BP: (114-134)/(75-85) 134/85  Body mass index is 25.13 kg/m².    Intake/Output Summary (Last 24 hours) at 2019 0839  Last data filed at 2019 0700  Gross per 24 hour   Intake 360 ml   Output 2675 ml   Net -2315 ml       Physical Exam   Constitutional: He is oriented to person, place, and time. He appears well-developed and well-nourished. No distress.   Cardiovascular: Normal rate, regular rhythm and normal heart sounds.   Pulmonary/Chest: Effort normal and breath sounds normal. No respiratory distress. He has no wheezes. He has no rales.   Abdominal: Soft. Bowel sounds are normal. He exhibits no distension.   Musculoskeletal: Normal range of motion. He exhibits no edema.   Neurological: He is alert and oriented to person, place, and time.   Skin: Skin is warm. Capillary refill takes less than 2 seconds.   Psychiatric: He has a normal mood and affect. His behavior is normal.   Nursing note and vitals reviewed.    Results Review:       I reviewed the patient's new clinical  results.  Results from last 7 days   Lab Units 07/15/19  1023 07/13/19  1304 07/11/19  0405   WBC 10*3/mm3 11.31* 14.60*  --    HEMOGLOBIN g/dL 12.1* 13.3 13.0   PLATELETS 10*3/mm3 410 317  --      Results from last 7 days   Lab Units 07/11/19  0406   SODIUM mmol/L 141   POTASSIUM mmol/L 4.0   CHLORIDE mmol/L 102   CO2 mmol/L 26.0   BUN mg/dL 10   CREATININE mg/dL 0.80   CALCIUM mg/dL 8.8   Estimated Creatinine Clearance: 127.6 mL/min (by C-G formula based on SCr of 0.8 mg/dL).      Assessment/Plan     Lumbar spinal stenosis      PLAN  1.  Fevers chills and leukocytosis: Remains afebrile, continue to encourage OOB activities and incentive spirometer often when awake    Disposition  Per primary but no medical contraindication with discharge      Sg Bowens MD  [facility]  07/16/19  8:39 AM

## 2019-07-16 NOTE — PAYOR COMM NOTE
"UR CONTACT:  CÉSAR P: [phone]       F: [phone]  AWAITING RESPONSE FROM CLINICAL FAXED ON 7/12/19        Teo Napier (44 y.o. Male)     Date of Birth Social Security Number Address Home Phone MRN    1975  [id number] [facility] [address] [facility] [phone] [id number]    Rastafari Marital Status          Shinto        Admission Date Admission Type Admitting Provider Attending Provider Department, Room/Bed    7/10/19 Elective Luther Navarrete, Luther Vaughn DO [facility] [address] [place], P594/1    Discharge Date Discharge Disposition Discharge Destination                       Attending Provider:  Luther Navarrete DO    Allergies:  Cymbalta [Duloxetine Hcl]    Isolation:  None   Infection:  None   Code Status:  CPR    Ht:  172.7 cm (68\")   Wt:  75 kg (165 lb 4 oz)    Admission Cmt:  None   Principal Problem:  None                Active Insurance as of 7/10/2019     Primary Coverage     Payor Plan Insurance Group Employer/Plan Group    [employer] [place] [employer] [place]      Payor Plan Address Payor Plan Phone Number Payor Plan Fax Number Effective Dates    [address] [address] [phone]  6/27/2019 - None Entered    [facility] [address]       Subscriber Name Subscriber Birth Date Member ID       TEO NAPIER 1975 [id number]                 Emergency Contacts      (Rel.) Home Phone Work Phone Mobile Phone    PASTOR NAPIER (Spouse) [phone] -- [phone]    AIHARRY DUVALL (Sister) -- -- [phone]            ICU Vital Signs     Row Name 07/16/19 0852 07/16/19 0815 07/16/19 0546 07/16/19 0205 07/16/19 0000       Vitals    Temp  98 °F (36.7 °C)  --  98.1 °F (36.7 °C)  --  --    Temp src  Oral  --  Oral  --  --    Pulse  68  --  79  --  --    Heart Rate Source  Monitor  --  Monitor  --  --    Resp  18  --  18  --  --    Resp Rate Source  Visual  --  Visual  --  --    BP  116/72  --  134/85  --  --    BP Location  Left arm  --  Left arm  --  --    BP Method  Automatic  " --  Automatic  --  --    Patient Position  Lying  --  Lying  --  --       Oxygen Therapy    SpO2  91 %  --  92 %  91 %  --    Device (Oxygen Therapy)  room air  room air  room air  --  room air        Lines, Drains & Airways    Active LDAs     Name:   Placement date:   Placement time:   Site:   Days:    Peripheral IV 07/14/19 2319 Right Wrist   07/14/19 2319    Wrist   1                Hospital Medications (active)       Dose Frequency Start End    acetaminophen (TYLENOL) tablet 1,000 mg 1,000 mg Every 6 Hours PRN 7/11/2019 7/15/2019    Sig - Route: Take 2 tablets by mouth Every 6 (Six) Hours As Needed for Mild Pain . - Oral    bisacodyl (DULCOLAX) EC tablet 5 mg 5 mg Daily PRN 7/10/2019     Sig - Route: Take 1 tablet by mouth Daily As Needed for Constipation. - Oral    bisacodyl (DULCOLAX) suppository 10 mg 10 mg Daily PRN 7/10/2019     Sig - Route: Insert 1 suppository into the rectum Daily As Needed for Constipation. - Rectal    cholecalciferol (VITAMIN D3) tablet 400 Units 400 Units Daily 7/10/2019     Sig - Route: Take 1 tablet by mouth Daily. - Oral    clonazePAM (KlonoPIN) tablet 1 mg 1 mg 2 Times Daily 7/10/2019     Sig - Route: Take 2 tablets by mouth 2 (Two) Times a Day. - Oral    cyclobenzaprine (FLEXERIL) tablet 5 mg 5 mg Every 8 Hours Scheduled 7/11/2019     Sig - Route: Take 0.5 tablets by mouth Every 8 (Eight) Hours. - Oral    docusate sodium (COLACE) capsule 100 mg 100 mg 2 Times Daily PRN 7/10/2019     Sig - Route: Take 1 capsule by mouth 2 (Two) Times a Day As Needed for Constipation. - Oral    gabapentin (NEURONTIN) capsule 400 mg 400 mg Daily With Breakfast 7/11/2019     Sig - Route: Take 1 capsule by mouth Daily With Breakfast. - Oral    gabapentin (NEURONTIN) capsule 400 mg 400 mg Daily With Lunch 7/10/2019     Sig - Route: Take 1 capsule by mouth Daily With Lunch. - Oral    gabapentin (NEURONTIN) capsule 800 mg 800 mg Nightly 7/10/2019     Sig - Route: Take 2 capsules by mouth Every Night.  "- Oral    HYDROmorphone (DILAUDID) injection 1 mg 1 mg Every 2 Hours PRN 7/13/2019 7/23/2019    Sig - Route: Infuse 1 mL into a venous catheter Every 2 (Two) Hours As Needed for Severe Pain . - Intravenous    HYDROmorphone (DILAUDID) tablet 8 mg 8 mg Every 4 Hours PRN 7/12/2019     Sig - Route: Take 4 tablets by mouth Every 4 (Four) Hours As Needed for Moderate Pain  or Severe Pain . - Oral    magnesium hydroxide (MILK OF MAGNESIA) suspension 2400 mg/10mL 10 mL 10 mL Daily PRN 7/10/2019     Sig - Route: Take 10 mL by mouth Daily As Needed for Constipation. - Oral    naloxone (NARCAN) injection 0.1 mg 0.1 mg Every 5 Minutes PRN 7/10/2019     Sig - Route: Infuse 0.25 mL into a venous catheter Every 5 (Five) Minutes As Needed for Opioid Reversal or Respiratory Depression (see administration instructions). - Intravenous    ondansetron (ZOFRAN) injection 4 mg 4 mg Every 6 Hours PRN 7/10/2019     Sig - Route: Infuse 2 mL into a venous catheter Every 6 (Six) Hours As Needed for Nausea or Vomiting. - Intravenous    Linked Group 1:  \"Or\" Linked Group Details        ondansetron (ZOFRAN) tablet 4 mg 4 mg Every 6 Hours PRN 7/10/2019     Sig - Route: Take 1 tablet by mouth Every 6 (Six) Hours As Needed for Nausea or Vomiting. - Oral    Linked Group 1:  \"Or\" Linked Group Details        OXcarbazepine (TRILEPTAL) tablet 150 mg 150 mg Nightly 7/10/2019     Sig - Route: Take 1 tablet by mouth Every Night. - Oral    oxyCODONE (oxyCONTIN) 12 hr tablet 10 mg 10 mg Every 12 Hours Scheduled 7/11/2019 7/21/2019    Sig - Route: Take 1 tablet by mouth Every 12 (Twelve) Hours. - Oral    pantoprazole (PROTONIX) EC tablet 40 mg 40 mg Every Morning 7/11/2019     Sig - Route: Take 1 tablet by mouth Every Morning. - Oral    polyethylene glycol 3350 powder (packet) 17 g Daily PRN 7/10/2019     Sig - Route: Take 17 g by mouth Daily As Needed for Constipation. - Oral    promethazine (PHENERGAN) injection 12.5 mg 12.5 mg Every 6 Hours PRN 7/10/2019  " "   Sig - Route: Inject 0.5 mL into the appropriate muscle as directed by prescriber Every 6 (Six) Hours As Needed for Nausea or Vomiting. - Intramuscular    Linked Group 2:  \"Or\" Linked Group Details        promethazine (PHENERGAN) suppository 12.5 mg 12.5 mg Every 6 Hours PRN 7/10/2019     Sig - Route: Insert 0.5 suppositories into the rectum Every 6 (Six) Hours As Needed for Nausea or Vomiting. - Rectal    Linked Group 2:  \"Or\" Linked Group Details        promethazine (PHENERGAN) tablet 12.5 mg 12.5 mg Every 6 Hours PRN 7/10/2019     Sig - Route: Take 1 tablet by mouth Every 6 (Six) Hours As Needed for Nausea or Vomiting. - Oral    Linked Group 2:  \"Or\" Linked Group Details        sennosides-docusate sodium (SENOKOT-S) 8.6-50 MG tablet 1 tablet 1 tablet Nightly PRN 7/10/2019     Sig - Route: Take 1 tablet by mouth At Night As Needed for Constipation. - Oral    sertraline (ZOLOFT) tablet 100 mg 100 mg Nightly 7/10/2019     Sig - Route: Take 1 tablet by mouth Every Night. - Oral    sodium chloride 0.9 % flush 3 mL 3 mL Every 12 Hours Scheduled 7/10/2019     Sig - Route: Infuse 3 mL into a venous catheter Every 12 (Twelve) Hours. - Intravenous    sodium chloride 0.9 % flush 3-10 mL 3-10 mL As Needed 7/10/2019     Sig - Route: Infuse 3-10 mL into a venous catheter As Needed for Line Care. - Intravenous    varenicline (CHANTIX) tablet 1 mg 1 mg 2 Times Daily 7/10/2019     Sig - Route: Take 2 tablets by mouth 2 (Two) Times a Day. - Oral               Physician Progress Notes       Jennie Harris PA at 7/16/2019  7:44 AM     Attestation signed by Star Mccall MD at 7/16/2019  9:11 AM    I have reviewed the documentation above and agree.                    Orthopedic Spine Progress Note    Subjective     Post-Operative Day: 6 post-spine procedure L3-5 MINIMALLY INVASIVE TRANSFORMINAL LUMBAR INTERBODY FUSION  Systemic or Specific Complaints: Pain Control    Objective     Vital signs in last 24 hours:  Temp:  " [98.1 °F (36.7 °C)-98.8 °F (37.1 °C)] 98.1 °F (36.7 °C)  Heart Rate:  [78-94] 79  Resp:  [18] 18  BP: (114-134)/(75-85) 134/85    General: alert, appears stated age and cooperative   Neurovascular: stable   Wound: Wound clean and dry no evidence of infection  Diffuse edema.   Range of Motion: limited   DVT Exam: No evidence of DVT seen on physical exam.     Data Review  CBC:  Results from last 7 days   Lab Units 07/15/19  1023   WBC 10*3/mm3 11.31*   RBC 10*6/mm3 3.74*   HEMOGLOBIN g/dL 12.1*   HEMATOCRIT % 35.1*   PLATELETS 10*3/mm3 410     Lab Results   Component Value Date    GLUCOSE 111 (H) 2019    BUN 10 2019    CREATININE 0.80 2019    EGFRIFNONA 106 2019    BCR 12.5 2019    K 4.0 2019    CO2 26.0 2019    CALCIUM 8.8 2019    ALBUMIN 4.20 2019    LABIL2 1.9 10/01/2018    AST 14 2019    ALT 9 2019       Assessment/Plan     Status post-spine procedure:  Pain Relief: some relief  Okay to discharge to rehabilitation from a spine standpoint. I do believe the patient needs LISA due to his unstable home environment. He lives at a boarding house and there is a high chance he will be readmitted for infection if he is unable to get adequate home health care.If he does not qualify for rehabilitation, I would change his Optifoam bandages, replacing them with new Optifoam bandages which can be left on for a week.   Patient has signed prescriptions on chart.  Follow up in office in 1-2 weeks.      Activity: out of bed and ambulate    Weight Bearing: FWB     LOS: 6 days     HECTOR Montez    Date: 2019      Electronically signed by Star Mccall MD at 2019  9:11 AM     Sg Bowens MD at 7/15/2019 10:01 AM               LOS: 5 days     Name: Teo Reynoso  Age/Sex: 44 y.o. male  :  1975        PCP: Jt Marino MD  No chief complaint on file.     Subjective   He is postop day 5 from his lumbar spine surgery.   Following the procedure he has had a tremendous issue with pain control.  He had a fever 3 days ago and has had some subjective fevers and chills but nothing is been documented over the last 3 days.  A chest x-ray and a CBC were done at the time of his fever his white blood cell count was elevated at 14 and his chest x-ray showed some atelectasis.  We have been asked to reevaluate the patient.  Again is been fever free at this point but is complaining of subjective fevers and chills.  Otherwise denies any urinary symptoms denies cough or shortness of breath.  Per orthopedics the wound looks good with no purulence or signs of infection.  General: +Fever or Chills, Cardiac: No Chest Pain or Palpitations, Resp: No Cough or SOA, GI: No Nausea, Vomiting, or Diarrhea and Other: No bleeding      cholecalciferol 400 Units Oral Daily   clonazePAM 1 mg Oral BID   cyclobenzaprine 5 mg Oral Q8H   gabapentin 400 mg Oral Daily With Breakfast   gabapentin 400 mg Oral Daily With Lunch   gabapentin 800 mg Oral Nightly   OXcarbazepine 150 mg Oral Nightly   oxyCODONE 10 mg Oral Q12H   pantoprazole 40 mg Oral QAM   sertraline 100 mg Oral Nightly   sodium chloride 3 mL Intravenous Q12H   varenicline 1 mg Oral BID       sodium chloride 100 mL/hr Last Rate: Stopped (07/13/19 0523)       Objective   Vital Signs  Temp:  [98.7 °F (37.1 °C)-99.2 °F (37.3 °C)] 98.7 °F (37.1 °C)  Heart Rate:  [61-77] 77  Resp:  [18-20] 18  BP: (119-125)/(69-81) 121/79  Body mass index is 25.13 kg/m².    Intake/Output Summary (Last 24 hours) at 7/15/2019 1001  Last data filed at 7/15/2019 0232  Gross per 24 hour   Intake --   Output 950 ml   Net -950 ml       Physical Exam   Constitutional: He is oriented to person, place, and time. He appears well-developed and well-nourished. No distress.   Cardiovascular: Normal rate, regular rhythm and normal heart sounds.   Pulmonary/Chest: Effort normal and breath sounds normal. No respiratory distress. He has no wheezes.  He has no rales.   Abdominal: Soft. Bowel sounds are normal. He exhibits no distension.   Musculoskeletal: Normal range of motion. He exhibits no edema.   Neurological: He is alert and oriented to person, place, and time.   Skin: Skin is warm. Capillary refill takes less than 2 seconds.   Psychiatric: He has a normal mood and affect. His behavior is normal.   Nursing note and vitals reviewed.        Results Review:       I reviewed the patient's new clinical results.  Results from last 7 days   Lab Units 07/15/19  1023 07/13/19  1304 07/11/19  0405   WBC 10*3/mm3 11.31* 14.60*  --    HEMOGLOBIN g/dL 12.1* 13.3 13.0   PLATELETS 10*3/mm3 410 317  --      Results from last 7 days   Lab Units 07/11/19  0406   SODIUM mmol/L 141   POTASSIUM mmol/L 4.0   CHLORIDE mmol/L 102   CO2 mmol/L 26.0   BUN mg/dL 10   CREATININE mg/dL 0.80   CALCIUM mg/dL 8.8   Estimated Creatinine Clearance: 127.6 mL/min (by C-G formula based on SCr of 0.8 mg/dL).      Assessment/Plan     Lumbar spinal stenosis      PLAN  1.  Fevers chills and leukocytosis: I repeated a white blood cell count today it is near normal lateral at 11.  There is no left shift to this making a bacterial infection less likely.  I continue to feel like this is probably related to atelectasis and poor mobility.  I have asked the patient to be aggressive with his incentive spirometer and to get up out of the bed as much as possible today.  From my standpoint is cleared to discharge to skilled nursing facility for further rehab facility as recommended by the primary team.  I do not see any indication for antibiotics at this time.  I discussed this with the patient at the bedside addressed all questions to the best of my ability.      Disposition  Per primary but no medical contraindication with discharge      Sg Bowens MD  [facility]  07/15/19  10:01 AM            Electronically signed by Sg Bowens MD at 7/15/2019  2:01 PM      Jennie Harris PA at 7/15/2019  7:43 AM     Attestation signed by Star Mccall MD at 7/15/2019  3:33 PM    I have reviewed the documentation above and agree.                    Orthopedic Spine Progress Note    Subjective     Post-Operative Day: 5 post-spine procedure L3-5 MINIMALLY INVASIVE TRANSFORMINAL LUMBAR INTERBODY FUSION  Systemic or Specific Complaints: Pain Control    Objective     Vital signs in last 24 hours:  Temp:  [97.8 °F (36.6 °C)-99.2 °F (37.3 °C)] 98.7 °F (37.1 °C)  Heart Rate:  [61-78] 77  Resp:  [18-20] 18  BP: (115-125)/(69-81) 121/79    General: alert, appears stated age and cooperative   Neurovascular: stable   Wound: Wound clean and dry no evidence of infection  Diffuse edema.   Range of Motion: limited   DVT Exam: No evidence of DVT seen on physical exam.     Data Review  CBC:  Results from last 7 days   Lab Units 07/13/19  1304   WBC 10*3/mm3 14.60*   RBC 10*6/mm3 4.17   HEMOGLOBIN g/dL 13.3   HEMATOCRIT % 39.6   PLATELETS 10*3/mm3 317     Lab Results   Component Value Date    GLUCOSE 111 (H) 07/11/2019    BUN 10 07/11/2019    CREATININE 0.80 07/11/2019    EGFRIFNONA 106 07/11/2019    BCR 12.5 07/11/2019    K 4.0 07/11/2019    CO2 26.0 07/11/2019    CALCIUM 8.8 07/11/2019    ALBUMIN 4.20 07/05/2019    LABIL2 1.9 10/01/2018    AST 14 07/05/2019    ALT 9 07/05/2019       Assessment/Plan     Status post-spine procedure:  Pain Relief: some relief  Okay to discharge to rehabilitation from a spine standpoint.  However the patient has been running a fever and has leukocytosis.  Minimal changes noted on chest x-ray.  We are requesting the hospitalist's input on this.      Activity: out of bed and ambulate    Weight Bearing: FWB     LOS: 5 days     Luther Rosalba, DO    Date: 7/15/2019      Electronically signed by Star Mccall MD at 7/15/2019  3:33 PM     Dez Woo MD at 7/14/2019  7:12 AM            Orthopaedic Surgery   Daily Progress Note  Dr. Dez Woo  "  [phone]  DEMOGRAPHICS:   · Teo Reynoso   · Age:44 y.o.   · MRN:[id number]  · Admitted: 7/10/2019    PROCEDURE: 4 Days Post-Op s/p Procedure(s):  STAGE 1 L3-5 DIRECT LATERAL INTERBODY FUSION WITH BONE MORPHAGENIC PROTEIN STAGE 2 POSTERIOR SPINAL FUSION L3-5  STAGE 2 POSTERIOR SPINAL FUSION     /64 (BP Location: Left arm, Patient Position: Lying)   Pulse 68   Temp 98 °F (36.7 °C) (Oral)   Resp 22   Ht 172.7 cm (68\")   Wt 75 kg (165 lb 4 oz)   SpO2 96%   BMI 25.13 kg/m²      Lab Results (last 24 hours)     Procedure Component Value Units Date/Time    CBC & Differential [[id number]] Collected:  07/13/19 1304    Specimen:  Blood Updated:  07/13/19 1334    Narrative:       The following orders were created for panel order CBC & Differential.  Procedure                               Abnormality         Status                     ---------                               -----------         ------                     CBC Auto Differential[[id number]]        Abnormal            Final result                 Please view results for these tests on the individual orders.    CBC Auto Differential [[id number]]  (Abnormal) Collected:  07/13/19 1304    Specimen:  Blood from Hand, Right Updated:  07/13/19 1334     WBC 14.60 10*3/mm3      RBC 4.17 10*6/mm3      Hemoglobin 13.3 g/dL      Hematocrit 39.6 %      MCV 95.0 fL      MCH 31.9 pg      MCHC 33.6 g/dL      RDW 12.9 %      RDW-SD 44.7 fl      MPV 10.8 fL      Platelets 317 10*3/mm3      Neutrophil % 65.4 %      Lymphocyte % 21.0 %      Monocyte % 10.7 %      Eosinophil % 2.2 %      Basophil % 0.4 %      Immature Grans % 0.3 %      Neutrophils, Absolute 9.54 10*3/mm3      Lymphocytes, Absolute 3.07 10*3/mm3      Monocytes, Absolute 1.56 10*3/mm3      Eosinophils, Absolute 0.32 10*3/mm3      Basophils, Absolute 0.06 10*3/mm3      Immature Grans, Absolute 0.05 10*3/mm3      nRBC 0.1 /100 WBC           Imaging Results (last 24 hours)     Procedure Component Value " Units Date/Time    XR Chest PA & Lateral [[id number]] Collected:  07/13/19 1516     Updated:  07/13/19 1614    Narrative:       TWO-VIEW CHEST     HISTORY: Cough and fever.     FINDINGS: The lungs are moderately well-expanded with what appears to be  some predominately linear atelectasis at the right base medially when  compared to the study of 07/05/2019. I cannot completely exclude minimal  developing infiltrate. There is also a suspicion of a tiny pleural  effusion as seen in the lateral view. The heart size is normal.     This report was finalized on 7/13/2019 4:11 PM by Dr. Jimmy Obrien M.D.             Patient Care Team:  Jt Marino MD as PCP - General (Internal Medicine)    SUBJECTIVE  Afebrile now  More comfortable    PHYSICAL EXAM  Mobilizing better     ASSESSMENT: Patient is a 44 y.o. male who is 4 Days Post-Op s/p Procedure(s):  STAGE 1 L3-5 DIRECT LATERAL INTERBODY FUSION WITH BONE MORPHAGENIC PROTEIN STAGE 2 POSTERIOR SPINAL FUSION L3-5  STAGE 2 POSTERIOR SPINAL FUSION     PLAN / DISPOSITION:  Continue to mobilize.  Continue to monitor temperature curve.    Dez Woo Sr, MD  Orthopaedic Surgery  [facility]  [phone]          Electronically signed by Dez Woo MD at 7/14/2019  7:13 AM     Luther Navarrete DO at 7/13/2019 12:00 PM          Orthopedic Spine Progress Note    Subjective     Post-Operative Day: 3 post-spine procedure L3-5 MINIMALLY INVASIVE TRANSFORMINAL LUMBAR INTERBODY FUSION  Systemic or Specific Complaints:  patient complains of severe pain and fever    Objective     Vital signs in last 24 hours:  Temp:  [98.1 °F (36.7 °C)-99.4 °F (37.4 °C)] 98.7 °F (37.1 °C)  Heart Rate:  [70-89] 75  Resp:  [16-22] 20  BP: (119-128)/(77-82) 119/78    General: alert, appears stated age and cooperative   Neurovascular: stable   Wound: Wound clean and dry no evidence of infection.   Range of Motion: limited   DVT Exam: No evidence of DVT seen on physical  exam.     Data Review  CBC:  Results from last 7 days   Lab Units 07/11/19  0405   HEMOGLOBIN g/dL 13.0   HEMATOCRIT % 37.4*       Assessment/Plan     Status post-spine procedure:     Pain Relief: no relief    Continues current post-op course  Dilaudid IV for severe breakthrough pain.  I encouraged him to ambulate with physical therapy.  I will get a CBC to evaluate his white count.  He may need blood cultures if he continues to run a fever.  His wounds look clean and dry.  I will also do a UA and chest x-ray.    Activity: out of bed and ambulate    Weight Bearing: FWB     LOS: 3 days     Luther Navarrete DO    Date: 7/13/2019      Electronically signed by Luther Navarrete DO at 7/13/2019 12:01 PM     Luther Navarrete DO at 7/12/2019  1:37 PM          Orthopedic Spine Progress Note    Subjective     Post-Operative Day: 2 post-spine procedure L3-L5 direct lateral and posterior fusion    Systemic or Specific Complaints: Pain poorly Controlled.  Poorly controlled back and leg pain.   No new pain or numbness in the legs.     Objective     Vital signs in last 24 hours:  Temp:  [97.1 °F (36.2 °C)-102 °F (38.9 °C)] 102 °F (38.9 °C)  Heart Rate:  [81-99] 88  Resp:  [16] 16  BP: (108-150)/(58-90) 150/90    General: alert, appears stated age and cooperative   Neurovascular: stable   Wound: Wound clean and dry no evidence of infection.   Range of Motion: limited   DVT Exam: No evidence of DVT seen on physical exam.     Data Review  CBC:  Results from last 7 days   Lab Units 07/11/19  0405   HEMOGLOBIN g/dL 13.0   HEMATOCRIT % 37.4*     Lab Results   Component Value Date    GLUCOSE 111 (H) 07/11/2019    BUN 10 07/11/2019    CREATININE 0.80 07/11/2019    EGFRIFNONA 106 07/11/2019    BCR 12.5 07/11/2019    K 4.0 07/11/2019    CO2 26.0 07/11/2019    CALCIUM 8.8 07/11/2019    ALBUMIN 4.20 07/05/2019    LABIL2 1.9 10/01/2018    AST 14 07/05/2019    ALT 9 07/05/2019       Assessment/Plan     Status post-spine procedure:  Continues current  post-op course  Increase dilaudid to 8mg.  Fever. Encouraged. Incentive spirometer  Up with PT    Activity: out of bed and ambulate    Weight Bearing: FWB     LOS: 2 days     Luther Navarrete DO    Date: 7/12/2019      Electronically signed by Luther Navarrete DO at 7/12/2019  1:39 PM             Brianna Aparicio, RN   Registered Nurse   Case Management   Progress Notes   Signed   Date of Service:  7/16/2019  8:57 AM   Creation Time:  7/16/2019  8:57 AM            Signed                 Continued Stay Note  [facility]     Patient Name: Teo Reynoso             MRN: [id number]  Today's Date: 7/16/2019                     Admit Date: 7/10/2019          Discharge Plan      Row Name 07/16/19 0854           Plan     Plan  Pending      Patient/Family in Agreement with Plan  yes     Plan Comments  Spoke to [facility] in regards to referrals made to [facility] and [facility] no beds and will follow up with the other 2 facilities                   Brianna Aparicio, RN   Registered Nurse   Case Management   Progress Notes   Signed   Date of Service:  7/15/2019  4:56 PM   Creation Time:  7/15/2019  4:56 PM            Signed                 Continued Stay Note  [facility]     Patient Name: Teo Reynoso             MRN: [id number]  Today's Date: 7/15/2019                     Admit Date: 7/10/2019          Discharge Plan      Row Name 07/15/19 1649           Plan     Plan  pending     Patient/Family in Agreement with Plan  yes     Plan Comments  Spoke with patient to inform him that he is unable to go to [facility] due to his increasing ambulation level. Patient stated he can not return to boarding house he was at prior to admission. Patient states all his family lives in [place].  Stated he would try to contact family. Discussed that CCP will F/U in am. Updated RN     Row Name 07/15/19 2594           Plan     Plan  F/U for d/c planning      Plan Comments  Per Hoang with [facility] regarding [place] the patient ambulated 250 feet with P.T and she is unable to accept due to his current level of functioning.  FATIMAH Silverman

## 2019-07-16 NOTE — DISCHARGE SUMMARY
Date of Admission: 7/10/2019  7:55 AM  Date of Discharge:  7/16/2019    Discharge Diagnosis: s/p Procedure(s):  STAGE 1 L3-5 DIRECT LATERAL INTERBODY FUSION WITH BONE MORPHAGENIC PROTEIN STAGE 2 POSTERIOR SPINAL FUSION L3-5  STAGE 2 POSTERIOR SPINAL FUSION,      Presenting Problem/History of Present Illness  Lumbar spinal stenosis [M48.061]     Attending Physician: Luther Navarrete DO    Consults:   Consults     Date and Time Order Name Status Description    7/15/2019 0934 Inpatient Hospitalist Consult      7/11/2019 1248 Inpatient Pain Medicine Physician Consult      7/10/2019 1706 Inpatient Consult to Hospitalist Completed           Procedures Performed  Procedure(s):  STAGE 1 L3-5 DIRECT LATERAL INTERBODY FUSION WITH BONE MORPHAGENIC PROTEIN STAGE 2 POSTERIOR SPINAL FUSION L3-5  STAGE 2 POSTERIOR SPINAL FUSION       Hospital Course  Patient is a 44 y.o. male presented with low back and bilateral leg pain and numbness which was unresponsive to conservative treatment.  The patient presented to the operating room for surgery.  DVT and antibiotic prophylaxis were given.  The patient was transferred to [facility].   Physical therapy was consulted to assist with ambulation and transfers.  Meng catheter was removed.    Condition on Discharge:  The patient's pain was adequately controlled and the patient was thought to be hemodynamically stable for discharge.    Discharge Disposition  Skilled Nursing Facility ([place] - External)    Discharge Medications     Discharge Medications      New Medications      Instructions Start Date   oxyCODONE ER 20 MG 12 hr tablet  Commonly known as:  OXYCONTIN   20 mg, Oral, Every 12 Hours         Changes to Medications      Instructions Start Date   HYDROmorphone 8 MG tablet  Commonly known as:  DILAUDID  What changed:    · medication strength  · how much to take  · reasons to take this   8 mg, Oral, Every 6 Hours PRN         Continue These Medications      Instructions Start Date   CHANTIX 1 MG  tablet  Generic drug:  varenicline   1 tablet, Oral, 2 Times Daily      clonazePAM 1 MG tablet  Commonly known as:  KlonoPIN   1 mg, Oral, 2 Times Daily      cyclobenzaprine 10 MG tablet  Commonly known as:  FLEXERIL   5-10 mg, Oral, Nightly PRN      gabapentin 800 MG tablet  Commonly known as:  NEURONTIN   800 mg, Oral, Nightly      gabapentin 800 MG tablet  Commonly known as:  NEURONTIN   400 mg, Oral, Every Morning      LORazepam 1 MG tablet  Commonly known as:  ATIVAN   1 mg, Oral, Every 8 Hours PRN      Melatonin 10 MG capsule   10 mg, Oral, Nightly      MULTIVITAMIN ADULTS PO   1 tablet, Oral, Nightly      omeprazole 40 MG capsule  Commonly known as:  priLOSEC   40 mg, Oral, Every Evening      polyethylene glycol packet  Commonly known as:  MIRALAX   17 g, Oral, Every Morning      sertraline 100 MG tablet  Commonly known as:  ZOLOFT   100 mg, Oral, Nightly      TRILEPTAL 150 MG tablet  Generic drug:  OXcarbazepine   150 mg, Oral, Nightly      Vitamin D3 400 units capsule   400 Units, Oral, Every Evening         Stop These Medications    Chlorhexidine Gluconate 2 % pads            Discharge Diet:   Diet Instructions     Diet:      Diet Texture / Consistency:  Regular          Activity at Discharge:   Activity Instructions     Discharge Activity      Post-op Instructions: Lumbar fusion  Dr. Luther Navarrete  [phone]      What are my limitations after surgery?  No lifting over 10lbs, pushing, pulling, or twisting for the first 6 weeks. It is ok to put on your socks and shoes.   Walking after surgery helps speed recovery and also helps to prevent post-operative blood clots.  You are encouraged to walk daily and increase distance as tolerated.  Activity will be gradually increased after 6 weeks. This will be discussed in detail at your follow up appointment.  Avoid running, jogging, or bicycle riding until instructed to do so.   It is not unusual to have intermittent pain or numbness in the buttocks and legs  after surgery and with increased activity. Do not be alarmed.  Wear your back brace during waking hours during the first 6 weeks. You may take it off to shower and sleep.  You may drive after 2 weeks. Do not drive while on narcotic pain medication.     Wound care  Your incision is closed with medical glue and internal sutures.  Unless you are told otherwise, there are no stitches to be removed.  Do not scrub the incision site or pick at the glue.  The glue helps to hold your incision closed as your body heals. It also helps to prevent infection.  Your incision(s) is covered with a unique bandage.  You may keep your bandage on for 7 days.  If you still have drainage from an incision, apply a clean dressing as needed.  Keep the incision site(s) clean and dry.  Showering is permitted after the first 48 hours as long as the wound is covered and kept dry. Do not get the incision site wet until it is completely scabbed over and no longer draining.   Do not apply any lotions or ointments to the wound until it is completely healed.  Do not submerge the incision site in a bathtub or pool until it is completely healed and free of scab.  It is normal to have some numbness around the incision sites.    Pain Control, Medication and Refills  You will be given prescriptions for pain medication at the time of discharge.   Reduce pain and swelling by applying ice to your back for 15 minutes at a time. You can do this several times a day for the first few days after surgery.   Call the office for prescription refills. PLEASE GIVE 48 HOURS NOTICE FOR ALL REFILL REQUESTS. ALSO NOTE WE DO NOT TAKE PRESCRIPTION CALLS ON WEEKENDS OR HOLIDAYS.  Narcotics and inactivity can cause constipation. Drink plenty of fluids and eat a high fiber diet. You may need to take an over-the-counter stool softener or laxative.  As your pain improves, you may try taking over the counter acetaminophen (Tylenol) instead of narcotic pain medicine. Most  narcotics also contain Tylenol, so be careful if you are combining these drugs with other Tylenol containing products. Do not exceed the recommended daily dose of Tylenol.  Some studies have implied that anti-inflammatory medicine (NSAIDs) can delay bone healing.  While I recommend avoiding prescription anti-inflammatory medicines, it is probably OK to take over the counter NSAIDs. However, you shuold minimize their use if possible during the first three months after surgery. Examples of prescription NSAIDs include meloxicam (Mobic), Celebrex, Diclofenac, Voltaren, Indocin and Toradol. OTC NSAIDs include ibuprofen (Motrin, Advil), naproxen (Aleve).        Healing  Some patients will receive a bone stimulator to wear after surgery. This is used if you have certain risk factors which may interfere with healing.  Do not smoke. Smoking interferes with bone formation and fusion.  It decreases vital blood flow to the healing areas.  It is also associated with chronic back pain and delayed nerve recovery which may increase post-operative nerve pain.      Who do I call if I have problems after surgery?  What should I look for?  Please call our office at [phone] if you develop any of the following:   Fever (over 101.5°), chills, or sweats  New weakness that began after you left the hospital  New bowel or bladder difficulty  Excessive swelling around your incision, excessive drainage or pus coming from the incision site  Difficulty breathing or swallowing  New pain or swelling in the calves  Chest pain or shortness of breath  Regular Office hours: Monday - Friday 8:00 a.m. - 4:30 p.m.  After hours emergencies: you may call the answering service at the same number or go to the emergency room    When is my next office appointment?  You should have your first post-op visit in 2 weeks.   Pay attention to the location of your post-operative visit.  It will be at one of our two locations:    [facility]            [address]                           [address]                           [place] [address]    [facility]                          [address]                           [address]                          [place] [address]          Follow-up Appointments  2 weeks

## 2019-07-16 NOTE — PROGRESS NOTES
Continued Stay Note  [facility]     Patient Name: Teo Reynoso  MRN: [id number]  Today's Date: 7/16/2019    Admit Date: 7/10/2019    Discharge Plan     Row Name 07/16/19 0854       Plan    Plan  Pending     Patient/Family in Agreement with Plan  yes    Plan Comments  Spoke to Manolo Parada in regards to referrals made to [facility]. [facility] has no beds and will follow up with the other 2 facilities        Discharge Codes    No documentation.             Brianna Aparicio, RN

## 2019-07-16 NOTE — PROGRESS NOTES
Case Management Discharge Note    Final Note: Discharged to [facility] Skilled bed    Destination - Selection Complete      Service Provider Request Status Selected Services Address Phone Number Fax Number    [facility] Selected Skilled Nursing [address] [id number] [phone]                Final Discharge Disposition Code: 03 - skilled nursing facility (SNF)

## 2019-07-16 NOTE — PROGRESS NOTES
Continued Stay Note  [facility]     Patient Name: Teo Reynoso  MRN: [id number]  Today's Date: 7/16/2019    Admit Date: 7/10/2019    Discharge Plan     Row Name 07/16/19 1443       Plan    Plan  Skilled bed at [facility]    Patient/Family in Agreement with Plan  yes    Plan Comments  Spoke to the Donna Yañez who re-evaluated the patient and stated that they can accept the patient today at [facility].  The patient requested transportation assistance and Yellow cab wheelchair transport arrangements will need to be made for the patient.  FATIMAH Silverman        Discharge Codes    No documentation.             FATIMAH Driver

## 2019-07-17 NOTE — PAYOR COMM NOTE
"UR CONTACT:  CÉSAR P: [phone]       F: [phone]  DISCHARGED.  AWAITING RESPONSE FROM CLINICAL.        Sergey Napieran DAVIDA (44 y.o. Male)     Date of Birth Social Security Number Address Home Phone MRN    1975  [id number] [address] [phone] [id number]    Jewish Marital Status          P[employer]        Admission Date Admission Type Admitting Provider Attending Provider Department, Room/Bed    7/10/19 Elective Luther Navarrete DO  [facility] [address], P594/1    Discharge Date Discharge Disposition Discharge Destination        7/16/2019 Skilled Nursing Facility (DC - External)              Attending Provider:  (none)   Allergies:  Cymbalta [Duloxetine Hcl]    Isolation:  None   Infection:  None   Code Status:  Prior    Ht:  172.7 cm (68\")   Wt:  75 kg (165 lb 4 oz)    Admission Cmt:  None   Principal Problem:  None                Active Insurance as of 7/10/2019     Primary Coverage     Payor Plan Insurance Group Employer/Plan Group    [employer] [place] [employer] [place]ICA[place]      Payor Plan Address Payor Plan Phone Number Payor Plan Fax Number Effective Dates    [address] [address] [phone]  6/27/2019 - None Entered    [facility] [id number]       Subscriber Name Subscriber Birth Date Member ID       TEO NAPIER 1975 [id number]                 Emergency Contacts      (Rel.) Home Phone Work Phone Mobile Phone    NAPIERPASTOR (Spouse) [phone] -- [phone]    AIHARRY DUVALL (Sister) -- -- [phone]               Discharge Summary      Luther Navarrete DO at 7/16/2019  3:11 PM          Date of Admission: 7/10/2019  7:55 AM  Date of Discharge:  7/16/2019    Discharge Diagnosis: s/p Procedure(s):  STAGE 1 L3-5 DIRECT LATERAL INTERBODY FUSION WITH BONE MORPHAGENIC PROTEIN STAGE 2 POSTERIOR SPINAL FUSION L3-5  STAGE 2 POSTERIOR SPINAL FUSION,      Presenting Problem/History of Present Illness  Lumbar spinal stenosis [M48.061]     Attending Physician: Luther" DO Rosalba    Consults:   Consults     Date and Time Order Name Status Description    7/15/2019 0934 Inpatient Hospitalist Consult      7/11/2019 0928 Inpatient Pain Medicine Physician Consult      7/10/2019 1707 Inpatient Consult to Hospitalist Completed           Procedures Performed  Procedure(s):  STAGE 1 L3-5 DIRECT LATERAL INTERBODY FUSION WITH BONE MORPHAGENIC PROTEIN STAGE 2 POSTERIOR SPINAL FUSION L3-5  STAGE 2 POSTERIOR SPINAL FUSION       Hospital Course  Patient is a 44 y.o. male presented with low back and bilateral leg pain and numbness which was unresponsive to conservative treatment.  The patient presented to the operating room for surgery.  DVT and antibiotic prophylaxis were given.  The patient was transferred to [facility].   Physical therapy was consulted to assist with ambulation and transfers.  Meng catheter was removed.    Condition on Discharge:  The patient's pain was adequately controlled and the patient was thought to be hemodynamically stable for discharge.    Discharge Disposition  Skilled Nursing Facility ([place] - External)    Discharge Medications     Discharge Medications      New Medications      Instructions Start Date   oxyCODONE ER 20 MG 12 hr tablet  Commonly known as:  OXYCONTIN   20 mg, Oral, Every 12 Hours         Changes to Medications      Instructions Start Date   HYDROmorphone 8 MG tablet  Commonly known as:  DILAUDID  What changed:    · medication strength  · how much to take  · reasons to take this   8 mg, Oral, Every 6 Hours PRN         Continue These Medications      Instructions Start Date   CHANTIX 1 MG tablet  Generic drug:  varenicline   1 tablet, Oral, 2 Times Daily      clonazePAM 1 MG tablet  Commonly known as:  KlonoPIN   1 mg, Oral, 2 Times Daily      cyclobenzaprine 10 MG tablet  Commonly known as:  FLEXERIL   5-10 mg, Oral, Nightly PRN      gabapentin 800 MG tablet  Commonly known as:  NEURONTIN   800 mg, Oral, Nightly      gabapentin 800 MG tablet  Commonly  known as:  NEURONTIN   400 mg, Oral, Every Morning      LORazepam 1 MG tablet  Commonly known as:  ATIVAN   1 mg, Oral, Every 8 Hours PRN      Melatonin 10 MG capsule   10 mg, Oral, Nightly      MULTIVITAMIN ADULTS PO   1 tablet, Oral, Nightly      omeprazole 40 MG capsule  Commonly known as:  priLOSEC   40 mg, Oral, Every Evening      polyethylene glycol packet  Commonly known as:  MIRALAX   17 g, Oral, Every Morning      sertraline 100 MG tablet  Commonly known as:  ZOLOFT   100 mg, Oral, Nightly      TRILEPTAL 150 MG tablet  Generic drug:  OXcarbazepine   150 mg, Oral, Nightly      Vitamin D3 400 units capsule   400 Units, Oral, Every Evening         Stop These Medications    Chlorhexidine Gluconate 2 % pads            Discharge Diet:   Diet Instructions     Diet:      Diet Texture / Consistency:  Regular          Activity at Discharge:   Activity Instructions     Discharge Activity      Post-op Instructions: Lumbar fusion  Dr. Luther Navarrete  [phone]      What are my limitations after surgery?  No lifting over 10lbs, pushing, pulling, or twisting for the first 6 weeks. It is ok to put on your socks and shoes.   Walking after surgery helps speed recovery and also helps to prevent post-operative blood clots.  You are encouraged to walk daily and increase distance as tolerated.  Activity will be gradually increased after 6 weeks. This will be discussed in detail at your follow up appointment.  Avoid running, jogging, or bicycle riding until instructed to do so.   It is not unusual to have intermittent pain or numbness in the buttocks and legs after surgery and with increased activity. Do not be alarmed.  Wear your back brace during waking hours during the first 6 weeks. You may take it off to shower and sleep.  You may drive after 2 weeks. Do not drive while on narcotic pain medication.     Wound care  Your incision is closed with medical glue and internal sutures.  Unless you are told otherwise, there are no  stitches to be removed.  Do not scrub the incision site or pick at the glue.  The glue helps to hold your incision closed as your body heals. It also helps to prevent infection.  Your incision(s) is covered with a unique bandage.  You may keep your bandage on for 7 days.  If you still have drainage from an incision, apply a clean dressing as needed.  Keep the incision site(s) clean and dry.  Showering is permitted after the first 48 hours as long as the wound is covered and kept dry. Do not get the incision site wet until it is completely scabbed over and no longer draining.   Do not apply any lotions or ointments to the wound until it is completely healed.  Do not submerge the incision site in a bathtub or pool until it is completely healed and free of scab.  It is normal to have some numbness around the incision sites.    Pain Control, Medication and Refills  You will be given prescriptions for pain medication at the time of discharge.   Reduce pain and swelling by applying ice to your back for 15 minutes at a time. You can do this several times a day for the first few days after surgery.   Call the office for prescription refills. PLEASE GIVE 48 HOURS NOTICE FOR ALL REFILL REQUESTS. ALSO NOTE WE DO NOT TAKE PRESCRIPTION CALLS ON WEEKENDS OR HOLIDAYS.  Narcotics and inactivity can cause constipation. Drink plenty of fluids and eat a high fiber diet. You may need to take an over-the-counter stool softener or laxative.  As your pain improves, you may try taking over the counter acetaminophen (Tylenol) instead of narcotic pain medicine. Most narcotics also contain Tylenol, so be careful if you are combining these drugs with other Tylenol containing products. Do not exceed the recommended daily dose of Tylenol.  Some studies have implied that anti-inflammatory medicine (NSAIDs) can delay bone healing.  While I recommend avoiding prescription anti-inflammatory medicines, it is probably OK to take over the counter  NSAIDs. However, you shuold minimize their use if possible during the first three months after surgery. Examples of prescription NSAIDs include meloxicam (Mobic), Celebrex, Diclofenac, Voltaren, Indocin and Toradol. OTC NSAIDs include ibuprofen (Motrin, Advil), naproxen (Aleve).        Healing  Some patients will receive a bone stimulator to wear after surgery. This is used if you have certain risk factors which may interfere with healing.  Do not smoke. Smoking interferes with bone formation and fusion.  It decreases vital blood flow to the healing areas.  It is also associated with chronic back pain and delayed nerve recovery which may increase post-operative nerve pain.      Who do I call if I have problems after surgery?  What should I look for?  Please call our office at [phone] if you develop any of the following:   Fever (over 101.5°), chills, or sweats  New weakness that began after you left the hospital  New bowel or bladder difficulty  Excessive swelling around your incision, excessive drainage or pus coming from the incision site  Difficulty breathing or swallowing  New pain or swelling in the calves  Chest pain or shortness of breath  Regular Office hours: Monday - Friday 8:00 a.m. - 4:30 p.m.  After hours emergencies: you may call the answering service at the same number or go to the emergency room    When is my next office appointment?  You should have your first post-op visit in 2 weeks.   Pay attention to the location of your post-operative visit.  It will be at one of our two locations:    [facility]           [address]                           [address]                           [place] [address]    [facility]                          [address]                           [address]                          [place] [address]          Follow-up Appointments  2 weeks                   Electronically signed by Luther Navarrete DO at  7/16/2019  3:12 PM

## 2019-09-28 ENCOUNTER — HOSPITAL ENCOUNTER (EMERGENCY)
Age: 44
Discharge: HOME | End: 2019-09-28
Attending: EMERGENCY MEDICINE
Payer: MEDICAID

## 2019-09-28 VITALS
TEMPERATURE: 98 F | DIASTOLIC BLOOD PRESSURE: 92 MMHG | HEART RATE: 70 BPM | RESPIRATION RATE: 19 BRPM | WEIGHT: 165 LBS | SYSTOLIC BLOOD PRESSURE: 149 MMHG | OXYGEN SATURATION: 99 % | BODY MASS INDEX: 25.01 KG/M2 | HEIGHT: 68 IN

## 2019-09-28 DIAGNOSIS — I10 HYPERTENSION, UNSPECIFIED TYPE: Primary | ICD-10-CM

## 2019-09-28 PROCEDURE — 6370000000 HC RX 637 (ALT 250 FOR IP): Performed by: EMERGENCY MEDICINE

## 2019-09-28 PROCEDURE — 99282 EMERGENCY DEPT VISIT SF MDM: CPT

## 2019-09-28 RX ORDER — CLONAZEPAM 1 MG/1
1 TABLET ORAL 2 TIMES DAILY PRN
COMMUNITY

## 2019-09-28 RX ORDER — CLONIDINE HYDROCHLORIDE 0.1 MG/1
0.1 TABLET ORAL 2 TIMES DAILY
Qty: 60 TABLET | Refills: 0 | Status: SHIPPED | OUTPATIENT
Start: 2019-09-28 | End: 2019-10-28

## 2019-09-28 RX ORDER — CLONIDINE HYDROCHLORIDE 0.1 MG/1
0.1 TABLET ORAL ONCE
Status: COMPLETED | OUTPATIENT
Start: 2019-09-28 | End: 2019-09-28

## 2019-09-28 RX ORDER — OXCARBAZEPINE 150 MG/1
150 TABLET, FILM COATED ORAL 2 TIMES DAILY
COMMUNITY

## 2019-09-28 RX ADMIN — CLONIDINE HYDROCHLORIDE 0.1 MG: 0.1 TABLET ORAL at 15:00

## 2019-09-28 ASSESSMENT — PAIN DESCRIPTION - PAIN TYPE: TYPE: CHRONIC PAIN

## 2019-09-28 ASSESSMENT — PAIN DESCRIPTION - LOCATION: LOCATION: BACK

## 2019-09-28 ASSESSMENT — PAIN SCALES - GENERAL: PAINLEVEL_OUTOF10: 2

## 2022-09-12 NOTE — PLAN OF CARE
Problem: Patient Care Overview  Goal: Plan of Care Review  Outcome: Ongoing (interventions implemented as appropriate)   07/14/19 0233   Coping/Psychosocial   Plan of Care Reviewed With patient   Plan of Care Review   Progress improving   OTHER   Outcome Summary client POD 3 stage I and II direct lateral interbody fusion. VSS, pain control achieved with scheduled medications, as well as prn dilaudid and toradol. assist x1 with ADL's tolerated fairly well, amb with walker and back brace with assist x1. plans to d/c to inpatient rehab when ready. in bed resting quietly, will continue to monitor.      Goal: Individualization and Mutuality  Outcome: Ongoing (interventions implemented as appropriate)    Goal: Discharge Needs Assessment  Outcome: Ongoing (interventions implemented as appropriate)      Problem: Pain, Chronic (Adult)  Goal: Acceptable Pain/Comfort Level and Functional Ability  Outcome: Ongoing (interventions implemented as appropriate)      Problem: Laminectomy/Foraminotomy/Discectomy (Adult)  Goal: Signs and Symptoms of Listed Potential Problems Will be Absent, Minimized or Managed (Laminectomy/Foraminotomy/Discectomy)  Outcome: Ongoing (interventions implemented as appropriate)    Goal: Anesthesia/Sedation Recovery  Outcome: Ongoing (interventions implemented as appropriate)         Use a cane or walker to help you with walking    Call and follow up with your surgery team at [facility]    Start gabapentin 300 mg at bedtime for a week and then increase to 300 mg twice daily  Call in case of side effects    We will get an EMG for arms and legs    Follow up in 3 months

## (undated) DEVICE — 1010 S-DRAPE TOWEL DRAPE 10/BX: Brand: STERI-DRAPE™

## (undated) DEVICE — DISPOSABLE BIPOLAR CABLE 12FT. (3.6M): Brand: KIRWAN

## (undated) DEVICE — DIL INSUL 8MM AND 13MM

## (undated) DEVICE — TP PROB RHYTHMLINK 2.5X200MM

## (undated) DEVICE — 3M™ IOBAN™ 2 ANTIMICROBIAL INCISE DRAPE 6650EZ: Brand: IOBAN™ 2

## (undated) DEVICE — OPTIFOAM GENTLE SA, POSTOP, 4X8: Brand: MEDLINE

## (undated) DEVICE — NDL TARGET BVL TP 11G 15CM

## (undated) DEVICE — TOTAL TRAY, 16FR 10ML SIL FOLEY, URN: Brand: MEDLINE

## (undated) DEVICE — BIPOLAR SEALER 23-112-1 AQM 6.0: Brand: AQUAMANTYS™

## (undated) DEVICE — Device

## (undated) DEVICE — SYR LUERLOK 30CC

## (undated) DEVICE — CODMAN® SURGICAL PATTIES 3/4" X 3/4" (1.91CM X 1.91CM): Brand: CODMAN®

## (undated) DEVICE — ELECTRD BLD EXT EDGE 1P COAT 6.5IN STRL

## (undated) DEVICE — LDWR EMG CLIP M/STAGE 1.5MM 183CM STRL

## (undated) DEVICE — 3M™ STERI-DRAPE™ INSTRUMENT POUCH 1018: Brand: STERI-DRAPE™

## (undated) DEVICE — GLV SURG SENSICARE PI LF PF 8 GRN STRL

## (undated) DEVICE — NDL HYPO ECLPS SFTY 22G 1 1/2IN

## (undated) DEVICE — SUT VIC 2/0 CT1 36IN

## (undated) DEVICE — ADHS SKIN DERMABOND TOP ADVANCED

## (undated) DEVICE — DRSNG SURESITE123 8X12IN

## (undated) DEVICE — CONN TBG Y 5 IN 1 LF STRL

## (undated) DEVICE — PK NEURO SPINE 40

## (undated) DEVICE — GLV SURG SENSICARE MICRO PF LF 8 STRL

## (undated) DEVICE — APPL DURAPREP IODOPHOR APL 26ML

## (undated) DEVICE — COVER,MAYO STAND,STERILE: Brand: MEDLINE

## (undated) DEVICE — 40MM SINGLE STEP STYLET: Brand: INVICTUS

## (undated) DEVICE — YANKAUER: Brand: DEROYAL

## (undated) DEVICE — DRP C/ARMOR

## (undated) DEVICE — SUT VIC 1 CT1 36IN J947H

## (undated) DEVICE — 6.0MM PRECISION ROUND

## (undated) DEVICE — ENDOPATH 10MM ENDOSCOPIC BLUNT CHERRY DISSECTORS (12 POUCHES CONTAINING 3 DISSECTORS EACH): Brand: ENDOPATH

## (undated) DEVICE — TOWEL,OR,DSP,ST,BLUE,STD,4/PK,20PK/CS: Brand: MEDLINE

## (undated) DEVICE — SMOKE EVACUATION TUBING WITH 7/8 IN TO 1/4 IN REDUCER: Brand: BUFFALO FILTER

## (undated) DEVICE — NDL SPINE 22G 31/2IN BLK

## (undated) DEVICE — STPLR SKIN VISISTAT WD 35CT

## (undated) DEVICE — DRP MICROSCOPE 4 BINOCULAR CV 54X150IN